# Patient Record
Sex: FEMALE | Race: BLACK OR AFRICAN AMERICAN | NOT HISPANIC OR LATINO | URBAN - METROPOLITAN AREA
[De-identification: names, ages, dates, MRNs, and addresses within clinical notes are randomized per-mention and may not be internally consistent; named-entity substitution may affect disease eponyms.]

---

## 2020-09-15 ENCOUNTER — OUT OF OFFICE VISIT (OUTPATIENT)
Dept: URBAN - METROPOLITAN AREA MEDICAL CENTER 27 | Facility: MEDICAL CENTER | Age: 50
End: 2020-09-15
Payer: COMMERCIAL

## 2020-09-15 ENCOUNTER — LAB OUTSIDE AN ENCOUNTER (OUTPATIENT)
Dept: URBAN - METROPOLITAN AREA CLINIC 78 | Facility: CLINIC | Age: 50
End: 2020-09-15

## 2020-09-15 DIAGNOSIS — R74.0 ABNORMAL AST AND ALT: ICD-10-CM

## 2020-09-15 DIAGNOSIS — K83.8 ACQUIRED DILATION OF COMMON BILE DUCT: ICD-10-CM

## 2020-09-15 DIAGNOSIS — R10.84 ABDOMINAL CRAMPING, GENERALIZED: ICD-10-CM

## 2020-09-15 DIAGNOSIS — R79.89 ABNORMAL C-REACTIVE PROTEIN: ICD-10-CM

## 2020-09-15 PROCEDURE — 99232 SBSQ HOSP IP/OBS MODERATE 35: CPT | Performed by: NURSE PRACTITIONER

## 2020-09-15 PROCEDURE — G8427 DOCREV CUR MEDS BY ELIG CLIN: HCPCS | Performed by: INTERNAL MEDICINE

## 2020-09-15 PROCEDURE — 99222 1ST HOSP IP/OBS MODERATE 55: CPT | Performed by: INTERNAL MEDICINE

## 2020-11-02 ENCOUNTER — OFFICE VISIT (OUTPATIENT)
Dept: URBAN - METROPOLITAN AREA CLINIC 23 | Facility: CLINIC | Age: 50
End: 2020-11-02

## 2020-11-13 ENCOUNTER — OFFICE VISIT (OUTPATIENT)
Dept: URBAN - METROPOLITAN AREA CLINIC 33 | Facility: CLINIC | Age: 50
End: 2020-11-13

## 2020-11-24 ENCOUNTER — OFFICE VISIT (OUTPATIENT)
Dept: URBAN - METROPOLITAN AREA CLINIC 35 | Facility: CLINIC | Age: 50
End: 2020-11-24

## 2020-11-24 VITALS
BODY MASS INDEX: 24.36 KG/M2 | TEMPERATURE: 98.4 F | HEIGHT: 71 IN | WEIGHT: 174 LBS | DIASTOLIC BLOOD PRESSURE: 98 MMHG | SYSTOLIC BLOOD PRESSURE: 160 MMHG

## 2020-11-24 RX ORDER — OLMESARTAN MEDOXOMIL 40 MG/1
1 TABLET TABLET ORAL ONCE A DAY
Qty: 30 | Status: ACTIVE | COMMUNITY

## 2020-11-24 RX ORDER — PANTOPRAZOLE SODIUM 40 MG/1
1 TABLET TABLET, DELAYED RELEASE ORAL ONCE A DAY
Qty: 90 TABLET | Refills: 1 | OUTPATIENT
Start: 2020-11-24

## 2020-11-24 RX ORDER — FENTANYL 25 UG/H
1 PATCH TO SKIN PATCH TRANSDERMAL
Status: ACTIVE | COMMUNITY

## 2020-11-24 RX ORDER — HYOSCYAMINE SULFATE 0.12 MG/1
1 TABLET AS NEEDED TABLET ORAL
Qty: 60 TABLET | Refills: 1 | OUTPATIENT
Start: 2020-11-24

## 2020-11-24 RX ORDER — NALOXEGOL OXALATE 25 MG/1
1 TABLET IN THE MORNING TABLET, FILM COATED ORAL ONCE A DAY
Qty: 30 | Refills: 1 | OUTPATIENT
Start: 2020-11-24

## 2020-11-24 RX ORDER — CETIRIZINE HYDROCHLORIDE 10 MG/1
1 TABLET TABLET, FILM COATED ORAL ONCE A DAY
Qty: 30 | Status: ACTIVE | COMMUNITY

## 2020-11-24 RX ORDER — ERGOCALCIFEROL 1.25 MG/1
1 CAPSULE CAPSULE ORAL
Qty: 4 | Status: ACTIVE | COMMUNITY

## 2020-11-24 RX ORDER — AMLODIPINE BESYLATE 10 MG/1
1 TABLET TABLET ORAL ONCE A DAY
Qty: 30 | Status: ACTIVE | COMMUNITY

## 2020-11-24 RX ORDER — LORAZEPAM 1 MG/1
1 TABLET AT BEDTIME AS NEEDED TABLET ORAL ONCE A DAY
Status: ACTIVE | COMMUNITY

## 2020-11-24 RX ORDER — LEVOTHYROXINE SODIUM 0.15 MG/1
1 TABLET IN THE MORNING ON AN EMPTY STOMACH TABLET ORAL ONCE A DAY
Qty: 30 | Status: ACTIVE | COMMUNITY

## 2020-11-24 RX ORDER — ALBUTEROL SULFATE 2.5 MG/3ML
3 ML AS NEEDED SOLUTION RESPIRATORY (INHALATION)
Status: ACTIVE | COMMUNITY

## 2020-11-24 NOTE — EXAM-MIGRATED EXAMINATIONS
GENERAL APPEARANCE: - alert, in no acute distress, well developed, well nourished;   HEAD: - normocephalic, atraumatic;   EYES: - sclera anicteric bilaterally;   EARS: - normal;   ORAL CAVITY: - mucosa moist;   THROAT: - clear;   NECK/THYROID: - neck supple, full range of motion, no cervical lymphadenopathy, no thyroid nodules, no thyromegaly, trachea midline;   LYMPH NODES: - no cervical adenopathy, no supraclavicular adenopathy, no periumbilical adenopathy;   SKIN: - no suspicious lesions, warm and dry, no spider angiomata, palmar erythema or icterus;   HEART: - no murmurs, regular rate and rhythm, S1, S2 normal;   LUNGS: - clear to auscultation bilaterally, good air movement, no wheezes, rales, rhonchi;   ABDOMEN: - bowel sounds present, no masses palpable, no rebound tenderness, soft, nontender, nondistended.  Hepatomegaly and splenomegaly;   RECTAL: - deferred by patient;   MUSCULOSKELETAL: - normal posture, normal gait and station, no decreased range of motion;   EXTREMITIES: - no clubbing, cyanosis, or edema;   NEUROLOGIC: - cranial nerves 2-12 grossly intact;   PSYCH: - cooperative with exam, mood/affect full range;

## 2020-11-24 NOTE — HPI-MIGRATED HPI
;   ;   ;   ;   ;   ;   ;   ;     Gastroparesis : She admit being diagnosed with Gastroparesis in () at  [facility] in [place], [place]..  She has been consuming small portion meals throughout the day with some relief. ;   Nausea/Vomiting : Admit episodes of nausea and vomiting that is associated with epigastric pain. She will take Ativan prn severe episodes with relief. She report Zofran cause headaches and constipation.;   Bloating : Admit generalized abdominal bloating that is associated with episodes of constipation.  Symptoms improve with evacuations.;   Change in bowel habits : Admit change in bowel habits with onset in (). Described as alternating between constipation and diarrhea.  She attribute symptoms to high dose of pain medication which was treated with Golytely doses throughout the day from  to .   Currently admit 1 incomplete bowel movement per day with occasional strain. Stools are formed to small semi-formed with occasional hard consistency. She will take Miralax occasionally without significant benefit. Also try increase fiber intake and walk more. Goal is to drink at least 2 liters of water per day.   Patient's first colonoscopy was completed at age 63. Her colonoscopy was performed by Dr. Cody Moon at [facility] in [place], [place] revealing 1 colon polyp (reported by patient).;   Rectal Bleeding : Admit rectal bleeding with onset fall of  and has occurred in an intermittent pattern.  Described at bright red to burgundy blood that is present on tissue and within the toilet bowl.  Symptoms will occur for 2 days then dissipate.  Last occurred in 2020.   She denies bleeding during strenuous evacuations.   She denies having a stool study. ;   Abdominal Pain : Admit abdominal pain with onset (2020). Located in the epigastrium.  Described as a sharp burning pain that will occur with or without eating.  Admit associated nausea and vomiting.  Consuming greasy, fatty, acidic or spicy food will cause symptoms to worsen.   She presented to [facility] ED on (2020) and (10/12/2020) with c/o abd pain worsening post prandial, as well as weakness, and SOB with ambulation. Upon arrival to the hospital patient's Hgb was 7.6 and Hct 23.5. Patient was given 2 units of PRBC. Hgb was then 9.4. She continued to c/o severe abdominal pain, no changes in bowel pattern. Patient was admitted and discharged on 10/18/2020 with Pepcid 20 mg 1 BID, which she has been taking without significant relief.  Admit having an EGD () at [facility] in [place], [place], with findings of H. pylori. She was treated with IV antibiotics. Also diagnosed with  Pancreatitis during this hospitalization in . ;   Hospital Follow Up : Patient presents today for a hospital follow up. Patient was admitted to [facility] on 2020, 10/12/2020, and again on 2020.  She was referred to our office by Dr. PAOLO Giordano (internal medicine) for a consultation for abdominal pain. Patient admits a previous hx of Pancreatitis in .   Patient presented herself to [facility] ED on 2020 for a Sickle Cell Crisis, she c/o worsening abd pain and SOB. Patient was admitted to the hospital until 10/9/2020. Upon arrival patient's labs revealed severe anemia and elevated LFTs. RBC 2.54, Hgb 8.3, Hct 24.8, Alk Phos 155, AST 62, ALT 58, Bili 3.0,   She completed a transfusion on 2020 in which she received 2 units of PRBC, Hgb and Hct had mild improved after transfusion.  Blood Transfusion : - >10 units , 2019- 6 Units    During her stay she had a Nuclear Medicine Lung Perfusion Scan that revealed normal lung perfusion.   She also had a Chest X-ray completed on 2020 that showed increased lung opacitis bilaterally, edema versus atypical infection. A repeat Chest X-ray was completed on 2020 revealing stable moderate to sever enlargment of the cardiac silhouette suggestive of cardiomegaly. No acute infiltrate.   Patient had breathing treatments during her hospital stay that improved her SOB. On 2020 patient's Hgb dropped from 8.3 to 7.9 and 2 more units of PRBC was given to patient. Patient was started on a Fenanyl Patch for pain patient c/o itching and dizziness with the use of patch but reports that is helping mildly.   Patient was discharged on 10/9/2020 she then returned to the ED 10/12/2020 with c/o abd pain worsening post prandial, as well as weakness, and SOB with ambulation. Upon arrival to the hospital patient's Hgb was 7.6 and Hct 23.5. Patient was given 2 units of PRBC. Hgb was then 9.4. She continued to c/o severe abdominal/ pain, no changes in bowel pattern. Patient was admitted and discharged on 10/18/2020.   On 2020 she presented to the ED department again with a hx of sickle cell, chronic back pain, asthma, anemia, pulmonary hypertension with c/o chest pain and SOB onset was 1 hr prior to her arriving to ER. She reports that the pain that she experienced was not like any of the other symptoms that she had expereinced previously.   Patient reports her abdominal pain is located in her epigastric region that she describes as sharp and burning. She admits that the pain will last for 1-2 days and then subside on its on. She denies taking any medications to relieve symptoms. She denies that symptoms are reduced after a bowel movement.   Currently reports 1-2 normal bowel movements a day with no blood, mucus, melena, pruritus ani, or rectal pain.   Patient's first colonoscopy was completed in  at age 43 due to a family hx of colon cancer - Brother -dx'd late 50's -  at age 63. Her colonoscopy revealed 1 colon polyp. ;   Pancreatitis : Patient admit h/o drug induced pancreatitis with onset in () at which time she was in patient at  [facility] in [place], [place].;

## 2020-11-25 ENCOUNTER — TELEPHONE ENCOUNTER (OUTPATIENT)
Dept: URBAN - METROPOLITAN AREA CLINIC 35 | Facility: CLINIC | Age: 50
End: 2020-11-25

## 2020-12-01 ENCOUNTER — TELEPHONE ENCOUNTER (OUTPATIENT)
Dept: URBAN - METROPOLITAN AREA CLINIC 35 | Facility: CLINIC | Age: 50
End: 2020-12-01

## 2020-12-02 ENCOUNTER — OFFICE VISIT (OUTPATIENT)
Dept: URBAN - METROPOLITAN AREA CLINIC 35 | Facility: CLINIC | Age: 50
End: 2020-12-02

## 2020-12-02 ENCOUNTER — TELEPHONE ENCOUNTER (OUTPATIENT)
Dept: URBAN - METROPOLITAN AREA CLINIC 35 | Facility: CLINIC | Age: 50
End: 2020-12-02

## 2020-12-02 VITALS — WEIGHT: 170 LBS | BODY MASS INDEX: 23.8 KG/M2 | HEIGHT: 71 IN

## 2020-12-02 PROBLEM — 16932000 NAUSEA AND VOMITING: Status: ACTIVE | Noted: 2020-11-24

## 2020-12-02 PROBLEM — 79922009 EPIGASTRIC PAIN: Status: ACTIVE | Noted: 2020-11-24

## 2020-12-02 PROBLEM — 429006005 FAMILY HISTORY OF MALIGNANT NEOPLASM OF GASTROINTESTINAL TRACT: Status: ACTIVE | Noted: 2020-11-24

## 2020-12-02 PROBLEM — 88111009 CHANGE IN BOWEL HABIT: Status: ACTIVE | Noted: 2020-11-24

## 2020-12-02 PROBLEM — 271832001 FLATULENCE, ERUCTATION AND GAS PAIN: Status: ACTIVE | Noted: 2020-11-24

## 2020-12-02 PROBLEM — 428283002 HISTORY OF POLYP OF COLON (SITUATION): Status: ACTIVE | Noted: 2020-11-24

## 2020-12-02 PROBLEM — 274774002: Status: ACTIVE | Noted: 2020-11-24

## 2020-12-02 PROBLEM — 80515008: Status: ACTIVE | Noted: 2020-11-24

## 2020-12-02 PROBLEM — 266464001 HEMORRHAGE OF RECTUM AND ANUS: Status: ACTIVE | Noted: 2020-11-24

## 2020-12-02 RX ORDER — PANTOPRAZOLE SODIUM 40 MG/1
1 TABLET TABLET, DELAYED RELEASE ORAL ONCE A DAY
Qty: 90 TABLET | Refills: 1 | Status: ACTIVE | COMMUNITY
Start: 2020-11-24

## 2020-12-02 RX ORDER — ALBUTEROL SULFATE 2.5 MG/3ML
3 ML AS NEEDED SOLUTION RESPIRATORY (INHALATION)
Status: ACTIVE | COMMUNITY

## 2020-12-02 RX ORDER — CETIRIZINE HYDROCHLORIDE 10 MG/1
1 TABLET TABLET, FILM COATED ORAL ONCE A DAY
Qty: 30 | Status: ACTIVE | COMMUNITY

## 2020-12-02 RX ORDER — LEVOTHYROXINE SODIUM 0.15 MG/1
1 TABLET IN THE MORNING ON AN EMPTY STOMACH TABLET ORAL ONCE A DAY
Qty: 30 | Status: ACTIVE | COMMUNITY

## 2020-12-02 RX ORDER — ERGOCALCIFEROL 1.25 MG/1
1 CAPSULE CAPSULE ORAL
Qty: 4 | Status: ACTIVE | COMMUNITY

## 2020-12-02 RX ORDER — HYOSCYAMINE SULFATE 0.12 MG/1
1 TABLET AS NEEDED TABLET ORAL
Qty: 60 TABLET | Refills: 1 | Status: ACTIVE | COMMUNITY
Start: 2020-11-24

## 2020-12-02 RX ORDER — FENTANYL 25 UG/H
1 PATCH TO SKIN PATCH TRANSDERMAL
Status: ACTIVE | COMMUNITY

## 2020-12-02 RX ORDER — NALOXEGOL OXALATE 25 MG/1
1 TABLET IN THE MORNING TABLET, FILM COATED ORAL ONCE A DAY
Qty: 30 | Refills: 1 | Status: ACTIVE | COMMUNITY
Start: 2020-11-24

## 2020-12-02 RX ORDER — AMLODIPINE BESYLATE 10 MG/1
1 TABLET TABLET ORAL ONCE A DAY
Qty: 30 | Status: ACTIVE | COMMUNITY

## 2020-12-02 RX ORDER — OLMESARTAN MEDOXOMIL 40 MG/1
1 TABLET TABLET ORAL ONCE A DAY
Qty: 30 | Status: ACTIVE | COMMUNITY

## 2020-12-02 RX ORDER — LORAZEPAM 1 MG/1
1 TABLET AT BEDTIME AS NEEDED TABLET ORAL ONCE A DAY
Status: ACTIVE | COMMUNITY

## 2020-12-02 NOTE — HPI-MIGRATED HPI
;   ;   ;   ;   ;   ;   ;   ;     Gastroparesis : Continutes to consume small portion meals throughout the day with some relief.   Last visit (2020)               She admit being diagnosed with Gastroparesis in () at  [facility] in [place], [place]..  She has been consuming small portion meals throughout the day with some relief. ;   Nausea/Vomiting : Denies vomiting. Admits occasional nausea associated with epigastric pain.   Last visit (2020)               Admit episodes of nausea and vomiting that is associated with epigastric pain. She will take Ativan prn severe episodes with relief. She report Zofran cause headaches and constipation.;   Bloating : Pt started having abd pain and bloating since Saturday.  She states her distention has improved since this morning. Admits relief of symptoms when lying down. Denies relief of symptoms after bowel movements or flatus.   Last visit (2020)               Admit generalized abdominal bloating that is associated with episodes of constipation.  Symptoms improve with evacuations.;   Change in bowel habits : Patient currently 1 bowel movement a day. Stools are typically formed and soft. She has been taking Movantik for 3 days and has not noticed any improvements.   Last visit (2020)               Admit change in bowel habits with onset in (). Described as alternating between constipation and diarrhea.  She attribute symptoms to high dose of pain medication which was treated with Golytely doses throughout the day from  to .   Currently admit 1 incomplete bowel movement per day with occasional strain. Stools are formed to small semi-formed with occasional hard consistency. She will take Miralax occasionally without significant benefit. Also try increase fiber intake and walk more. Goal is to drink at least 2 liters of water per day.   Patient's first colonoscopy was completed at age 63. Her colonoscopy was performed by Dr. Cody Moon at [facility] in [place], [place] revealing 1 colon polyp (reported by patient).;   Rectal Bleeding : Denies episodes of rectal bleeding since last visit.   Last visit (2020)               Admit rectal bleeding with onset fall of  and has occurred in an intermittent pattern.  Described at bright red to burgundy blood that is present on tissue and within the toilet bowl.  Symptoms will occur for 2 days then dissipate.  Last occurred in 2020.   She denies bleeding during strenuous evacuations.   She denies having a stool study. ;   Abdominal Pain : Continues to admit epigastric pain. Also with RUQ pain, and trying Hyoscamine without relief of symptoms.  She is currently taking Pantoprazole without relief. Denies any changes since last visit.   Last visit (2020)               Admit abdominal pain with onset (2020). Located in the epigastrium.  Described as a sharp burning pain that will occur with or without eating.  Admit associated nausea and vomiting.  Consuming greasy, fatty, acidic or spicy food will cause symptoms to worsen.   She presented to [facility] ED on (2020) and (10/12/2020) with c/o abd pain worsening post prandial, as well as weakness, and SOB with ambulation. Upon arrival to the hospital patient's Hgb was 7.6 and Hct 23.5. Patient was given 2 units of PRBC. Hgb was then 9.4. She continued to c/o severe abdominal pain, no changes in bowel pattern. Patient was admitted and discharged on 10/18/2020 with Pepcid 20 mg 1 BID, which she has been taking without significant relief.  Admit having an EGD () at [facility] in [place], [place], with findings of H. pylori. She was treated with IV antibiotics. Also diagnosed with  Pancreatitis during this hospitalization in . ;   Hospital Follow Up :  Last visit (2020)               Patient presents today for a hospital follow up. Patient was admitted to [facility] on 2020, 10/12/2020, and again on 2020.  She was referred to our office by Dr. PAOLO Giordano (internal medicine) for a consultation for abdominal pain. Patient admits a previous hx of Pancreatitis in .   Patient presented herself to [facility] ED on 2020 for a Sickle Cell Crisis, she c/o worsening abd pain and SOB. Patient was admitted to the hospital until 10/9/2020. Upon arrival patient's labs revealed severe anemia and elevated LFTs. RBC 2.54, Hgb 8.3, Hct 24.8, Alk Phos 155, AST 62, ALT 58, Bili 3.0,   She completed a transfusion on 2020 in which she received 2 units of PRBC, Hgb and Hct had mild improved after transfusion.  Blood Transfusion : 2020- >10 units , 2019- 6 Units    During her stay she had a Nuclear Medicine Lung Perfusion Scan that revealed normal lung perfusion.   She also had a Chest X-ray completed on 2020 that showed increased lung opacitis bilaterally, edema versus atypical infection. A repeat Chest X-ray was completed on 2020 revealing stable moderate to sever enlargment of the cardiac silhouette suggestive of cardiomegaly. No acute infiltrate.   Patient had breathing treatments during her hospital stay that improved her SOB. On 2020 patient's Hgb dropped from 8.3 to 7.9 and 2 more units of PRBC was given to patient. Patient was started on a Fenanyl Patch for pain patient c/o itching and dizziness with the use of patch but reports that is helping mildly.   Patient was discharged on 10/9/2020 she then returned to the ED 10/12/2020 with c/o abd pain worsening post prandial, as well as weakness, and SOB with ambulation. Upon arrival to the hospital patient's Hgb was 7.6 and Hct 23.5. Patient was given 2 units of PRBC. Hgb was then 9.4. She continued to c/o severe abdominal/ pain, no changes in bowel pattern. Patient was admitted and discharged on 10/18/2020.   On 2020 she presented to the ED department again with a hx of sickle cell, chronic back pain, asthma, anemia, pulmonary hypertension with c/o chest pain and SOB onset was 1 hr prior to her arriving to ER. She reports that the pain that she experienced was not like any of the other symptoms that she had expereinced previously.   Patient reports her abdominal pain is located in her epigastric region that she describes as sharp and burning. She admits that the pain will last for 1-2 days and then subside on its on. She denies taking any medications to relieve symptoms. She denies that symptoms are reduced after a bowel movement.   Currently reports 1-2 normal bowel movements a day with no blood, mucus, melena, pruritus ani, or rectal pain.   Patient's first colonoscopy was completed in  at age 43 due to a family hx of colon cancer - Brother -dx'd late 50's -  at age 63. Her colonoscopy revealed 1 colon polyp. ;   Pancreatitis :   Last visit (2020)               Patient admit h/o drug induced pancreatitis with onset in () at which time she was in patient at  [facility] in [place], [place].;

## 2020-12-03 ENCOUNTER — TELEPHONE ENCOUNTER (OUTPATIENT)
Dept: URBAN - METROPOLITAN AREA CLINIC 35 | Facility: CLINIC | Age: 50
End: 2020-12-03

## 2020-12-04 ENCOUNTER — TELEPHONE ENCOUNTER (OUTPATIENT)
Dept: URBAN - METROPOLITAN AREA CLINIC 35 | Facility: CLINIC | Age: 50
End: 2020-12-04

## 2020-12-07 ENCOUNTER — OFFICE VISIT (OUTPATIENT)
Dept: URBAN - METROPOLITAN AREA MEDICAL CENTER 27 | Facility: MEDICAL CENTER | Age: 50
End: 2020-12-07

## 2020-12-10 ENCOUNTER — TELEPHONE ENCOUNTER (OUTPATIENT)
Dept: URBAN - METROPOLITAN AREA CLINIC 35 | Facility: CLINIC | Age: 50
End: 2020-12-10

## 2020-12-10 RX ORDER — SUCRALFATE 1 G/1
1 TABLET ON AN EMPTY STOMACH TABLET ORAL TWICE A DAY
Qty: 60 | Refills: 1 | OUTPATIENT
Start: 2020-12-07

## 2020-12-16 ENCOUNTER — TELEPHONE ENCOUNTER (OUTPATIENT)
Dept: URBAN - METROPOLITAN AREA CLINIC 35 | Facility: CLINIC | Age: 50
End: 2020-12-16

## 2020-12-21 ENCOUNTER — OFFICE VISIT (OUTPATIENT)
Dept: URBAN - METROPOLITAN AREA CLINIC 33 | Facility: CLINIC | Age: 50
End: 2020-12-21

## 2020-12-21 RX ORDER — SUCRALFATE 1 G/1
1 TABLET ON AN EMPTY STOMACH TABLET ORAL TWICE A DAY
Qty: 60 | Refills: 1 | Status: ACTIVE | COMMUNITY
Start: 2020-12-07

## 2020-12-21 RX ORDER — FENTANYL 25 UG/H
1 PATCH TO SKIN PATCH TRANSDERMAL
Status: ACTIVE | COMMUNITY

## 2020-12-21 RX ORDER — AMLODIPINE BESYLATE 10 MG/1
1 TABLET TABLET ORAL ONCE A DAY
Qty: 30 | Status: ACTIVE | COMMUNITY

## 2020-12-21 RX ORDER — OLMESARTAN MEDOXOMIL 40 MG/1
1 TABLET TABLET ORAL ONCE A DAY
Qty: 30 | Status: ACTIVE | COMMUNITY

## 2020-12-21 RX ORDER — HYOSCYAMINE SULFATE 0.12 MG/1
1 TABLET AS NEEDED TABLET ORAL
Qty: 60 TABLET | Refills: 1 | Status: ACTIVE | COMMUNITY
Start: 2020-11-24

## 2020-12-21 RX ORDER — LEVOTHYROXINE SODIUM 0.15 MG/1
1 TABLET IN THE MORNING ON AN EMPTY STOMACH TABLET ORAL ONCE A DAY
Qty: 30 | Status: ACTIVE | COMMUNITY

## 2020-12-21 RX ORDER — ERGOCALCIFEROL 1.25 MG/1
1 CAPSULE CAPSULE ORAL
Qty: 4 | Status: ACTIVE | COMMUNITY

## 2020-12-21 RX ORDER — CETIRIZINE HYDROCHLORIDE 10 MG/1
1 TABLET TABLET, FILM COATED ORAL ONCE A DAY
Qty: 30 | Status: ACTIVE | COMMUNITY

## 2020-12-21 RX ORDER — NALOXEGOL OXALATE 25 MG/1
1 TABLET IN THE MORNING TABLET, FILM COATED ORAL ONCE A DAY
Qty: 30 | Refills: 1 | Status: ACTIVE | COMMUNITY
Start: 2020-11-24

## 2020-12-21 RX ORDER — PANTOPRAZOLE SODIUM 40 MG/1
1 TABLET TABLET, DELAYED RELEASE ORAL ONCE A DAY
Qty: 90 TABLET | Refills: 1 | Status: ACTIVE | COMMUNITY
Start: 2020-11-24

## 2020-12-21 RX ORDER — ALBUTEROL SULFATE 2.5 MG/3ML
3 ML AS NEEDED SOLUTION RESPIRATORY (INHALATION)
Status: ACTIVE | COMMUNITY

## 2020-12-21 RX ORDER — LORAZEPAM 1 MG/1
1 TABLET AT BEDTIME AS NEEDED TABLET ORAL ONCE A DAY
Status: ACTIVE | COMMUNITY

## 2020-12-21 NOTE — HPI-MIGRATED HPI
;   ;   ;   ;   ;   ;   ;   ;     Gastroparesis : She admits/denies any assoicated symptoms at this time.   Last visit (2020) Continutes to consume small portion meals throughout the day with some relief.   Last visit (2020)               She admit being diagnosed with Gastroparesis in () at  [facility] in [place], [place]  She has been consuming small portion meals throughout the day with some relief. ;   Nausea/Vomiting : She admits/denies continued episodes of nausea/vomiting since her last visit.    Last visit (2020) Denies vomiting. Admits occasional nausea associated with epigastric pain.   Last visit (2020)               Admit episodes of nausea and vomiting that is associated with epigastric pain. She will take Ativan prn severe episodes with relief. She report Zofran cause headaches and constipation.;   Bloating : She admits/denies improvement in bloating episodes.   Last visit (2020) Pt started having abd pain and bloating since Saturday.  She states her distention has improved since this morning. Admits relief of symptoms when lying down. Denies relief of symptoms after bowel movements or flatus.   Last visit (2020)               Admit generalized abdominal bloating that is associated with episodes of constipation.  Symptoms improve with evacuations.;   Change in bowel habits : Patient admits/denies that her bowel habits have returned to normal since her last visit.   Currently she reports --- bowel movements ---. Her stools are ---- with/without blood, mucus, melena, pruritus ani, or rectal pain.   Last visit (2020) Patient currently 1 bowel movement a day. Stools are typically formed and soft. She has been taking Movantik for 3 days and has not noticed any improvements.   Last visit (2020)                Admit change in bowel habits with onset in (). Described as alternating between constipation and diarrhea.  She attribute symptoms to high dose of pain medication which was treated with Golytely doses throughout the day from  to .   Currently admit 1 incomplete bowel movement per day with occasional strain. Stools are formed to small semi-formed with occasional hard consistency. She will take Miralax occasionally without significant benefit. Also try increase fiber intake and walk more. Goal is to drink at least 2 liters of water per day.   Patient's first colonoscopy was completed at age 63. Her colonoscopy was performed by Dr. Cody Moon at [facility] in [place], [place] revealing 1 colon polyp (reported by patient).;   Rectal Bleeding : Patient admits/denies any rectal bleeding since her last visit.   Last visit (2020) Denies episodes of rectal bleeding since last visit.   Last visit (2020)               Admit rectal bleeding with onset fall of  and has occurred in an intermittent pattern.  Described at bright red to burgundy blood that is present on tissue and within the toilet bowl.  Symptoms will occur for 2 days then dissipate.  Last occurred in 2020.   She denies bleeding during strenuous evacuations.   She denies having a stool study. ;   Abdominal Pain : Patient presents today for a follow up of abdominal pain and to review her EGD results. She reports that after her procedure she experienced severe burning in her stomach and abdomen as well a sore throat and hoarseness. She was prescribed Sucralfate for abd pain. She admits/denies continued use of Sucralfate at this time.   She continues to admits abdominal pain and describes symptoms as intermittent abd pain that lasts for minutes without provocation.    Patient had a KUB completed on 2020 with normal results. She also had a MRI completed on 2020 with no acute findings, expected common bile duct distention without choledocholithiasis, no focal liver or pancreas pathology, no definite findings for pancreatitis, left renal cyst, and the findings of sickle cell disease is unchanged.   ?  Pt feels Movantik isn't helping, wants to use Golytely, hasn't done enema yet, told her to proceed with enema    Last visit (2020) Continues to admit epigastric pain. Also with RUQ pain, and trying Hyoscamine without relief of symptoms.  She is currently taking Pantoprazole without relief. Denies any changes since last visit.   Last visit (2020)               Admit abdominal pain with onset (2020). Located in the epigastrium.  Described as a sharp burning pain that will occur with or without eating.  Admit associated nausea and vomiting.  Consuming greasy, fatty, acidic or spicy food will cause symptoms to worsen.   She presented to [facility] ED on (2020) and (10/12/2020) with c/o abd pain worsening post prandial, as well as weakness, and SOB with ambulation. Upon arrival to the hospital patient's Hgb was 7.6 and Hct 23.5. Patient was given 2 units of PRBC. Hgb was then 9.4. She continued to c/o severe abdominal pain, no changes in bowel pattern. Patient was admitted and discharged on 10/18/2020 with Pepcid 20 mg 1 BID, which she has been taking without significant relief.  Admit having an EGD () at [facility] in [place], [place], with findings of H. pylori. She was treated with IV antibiotics. Also diagnosed with  Pancreatitis during this hospitalization in . ;   Hospital Follow Up : Patient was admitted to [facility] hospital on 2020 - 2020 due to a sickle cell crisis. She c/o abd pain, leg pain, and acute crisis but with abd discomfort.  Patient improved with 2 UPRBC, IV Fluids, and Fentanyl PCA.   Last visit (2020)                Patient presents today for a hospital follow up. Patient was admitted to [facility] on 2020, 10/12/2020, and again on 2020.  She was referred to our office by Dr. PAOLO Giordano (internal medicine) for a consultation for abdominal pain. Patient admits a previous hx of Pancreatitis in .   Patient presented herself to [facility] ED on 2020 for a Sickle Cell Crisis, she c/o worsening abd pain and SOB. Patient was admitted to the hospital until 10/9/2020. Upon arrival patient's labs revealed severe anemia and elevated LFTs. RBC 2.54, Hgb 8.3, Hct 24.8, Alk Phos 155, AST 62, ALT 58, Bili 3.0,   She completed a transfusion on 2020 in which she received 2 units of PRBC, Hgb and Hct had mild improved after transfusion.  Blood Transfusion : - >10 units , 2019- 6 Units    During her stay she had a Nuclear Medicine Lung Perfusion Scan that revealed normal lung perfusion.   She also had a Chest X-ray completed on 2020 that showed increased lung opacitis bilaterally, edema versus atypical infection. A repeat Chest X-ray was completed on 2020 revealing stable moderate to sever enlargment of the cardiac silhouette suggestive of cardiomegaly. No acute infiltrate.   Patient had breathing treatments during her hospital stay that improved her SOB. On 2020 patient's Hgb dropped from 8.3 to 7.9 and 2 more units of PRBC was given to patient. Patient was started on a Fenanyl Patch for pain patient c/o itching and dizziness with the use of patch but reports that is helping mildly.   Patient was discharged on 10/9/2020 she then returned to the ED 10/12/2020 with c/o abd pain worsening post prandial, as well as weakness, and SOB with ambulation. Upon arrival to the hospital patient's Hgb was 7.6 and Hct 23.5. Patient was given 2 units of PRBC. Hgb was then 9.4. She continued to c/o severe abdominal/ pain, no changes in bowel pattern. Patient was admitted and discharged on 10/18/2020.   On 2020 she presented to the ED department again with a hx of sickle cell, chronic back pain, asthma, anemia, pulmonary hypertension with c/o chest pain and SOB onset was 1 hr prior to her arriving to ER. She reports that the pain that she experienced was not like any of the other symptoms that she had expereinced previously.   Patient reports her abdominal pain is located in her epigastric region that she describes as sharp and burning. She admits that the pain will last for 1-2 days and then subside on its on. She denies taking any medications to relieve symptoms. She denies that symptoms are reduced after a bowel movement.   Currently reports 1-2 normal bowel movements a day with no blood, mucus, melena, pruritus ani, or rectal pain.   Patient's first colonoscopy was completed in  at age 43 due to a family hx of colon cancer - Brother -dx'd late 50's -  at age 63. Her colonoscopy revealed 1 colon polyp. ;   Pancreatitis : She admits/denies any associated symptoms at this time.   Last visit (2020)                Patient admit h/o drug induced pancreatitis with onset in () at which time she was in patient at  [facility] in [place], [place].;

## 2020-12-23 ENCOUNTER — TELEPHONE ENCOUNTER (OUTPATIENT)
Dept: URBAN - METROPOLITAN AREA CLINIC 35 | Facility: CLINIC | Age: 50
End: 2020-12-23

## 2021-03-09 ENCOUNTER — OFFICE VISIT (OUTPATIENT)
Dept: URBAN - METROPOLITAN AREA CLINIC 35 | Facility: CLINIC | Age: 51
End: 2021-03-09

## 2021-05-04 ENCOUNTER — TELEPHONE ENCOUNTER (OUTPATIENT)
Dept: URBAN - METROPOLITAN AREA CLINIC 35 | Facility: CLINIC | Age: 51
End: 2021-05-04

## 2021-06-03 ENCOUNTER — OFFICE VISIT (OUTPATIENT)
Dept: URBAN - METROPOLITAN AREA CLINIC 33 | Facility: CLINIC | Age: 51
End: 2021-06-03

## 2021-08-31 ENCOUNTER — TELEPHONE ENCOUNTER (OUTPATIENT)
Dept: URBAN - METROPOLITAN AREA CLINIC 35 | Facility: CLINIC | Age: 51
End: 2021-08-31

## 2021-08-31 RX ORDER — NALOXEGOL OXALATE 25 MG/1
1 TABLET IN THE MORNING TABLET, FILM COATED ORAL ONCE A DAY
Qty: 30 | Refills: 0 | OUTPATIENT
Start: 2020-11-24

## 2021-08-31 RX ORDER — FAMOTIDINE 20 MG/1
1 TABLET TABLET, FILM COATED ORAL TWICE A DAY
Qty: 60 TABLET | Refills: 0 | OUTPATIENT
Start: 2021-09-01

## 2021-09-01 ENCOUNTER — TELEPHONE ENCOUNTER (OUTPATIENT)
Dept: URBAN - METROPOLITAN AREA CLINIC 35 | Facility: CLINIC | Age: 51
End: 2021-09-01

## 2022-05-13 ENCOUNTER — TELEPHONE ENCOUNTER (OUTPATIENT)
Dept: URBAN - METROPOLITAN AREA CLINIC 35 | Facility: CLINIC | Age: 52
End: 2022-05-13

## 2022-05-16 ENCOUNTER — OFFICE VISIT (OUTPATIENT)
Dept: URBAN - METROPOLITAN AREA CLINIC 33 | Facility: CLINIC | Age: 52
End: 2022-05-16

## 2022-05-16 RX ORDER — OLMESARTAN MEDOXOMIL 40 MG/1
1 TABLET TABLET ORAL ONCE A DAY
Qty: 30 | Status: ACTIVE | COMMUNITY

## 2022-05-16 RX ORDER — LORAZEPAM 1 MG/1
1 TABLET AT BEDTIME AS NEEDED TABLET ORAL ONCE A DAY
Status: ACTIVE | COMMUNITY

## 2022-05-16 RX ORDER — AMLODIPINE BESYLATE 10 MG/1
1 TABLET TABLET ORAL ONCE A DAY
Qty: 30 | Status: ACTIVE | COMMUNITY

## 2022-05-16 RX ORDER — CETIRIZINE HYDROCHLORIDE 10 MG/1
1 TABLET TABLET, FILM COATED ORAL ONCE A DAY
Qty: 30 | Status: ACTIVE | COMMUNITY

## 2022-05-16 RX ORDER — SUCRALFATE 1 G/1
1 TABLET ON AN EMPTY STOMACH TABLET ORAL TWICE A DAY
Qty: 60 | Refills: 1 | Status: ACTIVE | COMMUNITY
Start: 2020-12-07

## 2022-05-16 RX ORDER — NALOXEGOL OXALATE 25 MG/1
1 TABLET IN THE MORNING TABLET, FILM COATED ORAL ONCE A DAY
Qty: 30 | Refills: 0 | Status: ACTIVE | COMMUNITY
Start: 2020-11-24

## 2022-05-16 RX ORDER — HYOSCYAMINE SULFATE 0.12 MG/1
1 TABLET AS NEEDED TABLET ORAL
Qty: 60 TABLET | Refills: 1 | Status: ACTIVE | COMMUNITY
Start: 2020-11-24

## 2022-05-16 RX ORDER — FENTANYL 25 UG/H
1 PATCH TO SKIN PATCH TRANSDERMAL
Status: ACTIVE | COMMUNITY

## 2022-05-16 RX ORDER — FAMOTIDINE 20 MG/1
1 TABLET TABLET, FILM COATED ORAL TWICE A DAY
Qty: 60 TABLET | Refills: 0 | Status: ACTIVE | COMMUNITY
Start: 2021-09-01

## 2022-05-16 RX ORDER — ALBUTEROL SULFATE 2.5 MG/3ML
3 ML AS NEEDED SOLUTION RESPIRATORY (INHALATION)
Status: ACTIVE | COMMUNITY

## 2022-05-16 RX ORDER — LEVOTHYROXINE SODIUM 0.15 MG/1
1 TABLET IN THE MORNING ON AN EMPTY STOMACH TABLET ORAL ONCE A DAY
Qty: 30 | Status: ACTIVE | COMMUNITY

## 2022-05-16 RX ORDER — ERGOCALCIFEROL 1.25 MG/1
1 CAPSULE CAPSULE ORAL
Qty: 4 | Status: ACTIVE | COMMUNITY

## 2022-05-16 RX ORDER — PANTOPRAZOLE SODIUM 40 MG/1
1 TABLET TABLET, DELAYED RELEASE ORAL ONCE A DAY
Qty: 90 TABLET | Refills: 1 | Status: ACTIVE | COMMUNITY
Start: 2020-11-24

## 2022-05-16 NOTE — HPI-CONSTIPATION
52 y/o female presents today for a consultation for constipation.   Patient called on 5/13/20222 and states the Movantik isn't helping anymore.  She state best relief when she was using  Golytely gallon 1/2 cup QD.   Currently reports -- bowel movements --- with/out strain. Her stools are --- with/out blood, mucus, or melena. He admits/denies any rectal pain or pruritus ani.

## 2022-05-17 ENCOUNTER — OFFICE VISIT (OUTPATIENT)
Dept: URBAN - METROPOLITAN AREA CLINIC 35 | Facility: CLINIC | Age: 52
End: 2022-05-17

## 2022-05-17 RX ORDER — PANTOPRAZOLE SODIUM 40 MG/1
1 TABLET TABLET, DELAYED RELEASE ORAL ONCE A DAY
Qty: 90 TABLET | Refills: 1 | Status: ACTIVE | COMMUNITY
Start: 2020-11-24

## 2022-05-17 RX ORDER — FAMOTIDINE 20 MG/1
1 TABLET TABLET, FILM COATED ORAL TWICE A DAY
Qty: 60 TABLET | Refills: 0 | Status: ACTIVE | COMMUNITY
Start: 2021-09-01

## 2022-05-17 RX ORDER — NALOXEGOL OXALATE 25 MG/1
1 TABLET IN THE MORNING TABLET, FILM COATED ORAL ONCE A DAY
Qty: 30 | Refills: 0 | OUTPATIENT

## 2022-05-17 RX ORDER — NALOXEGOL OXALATE 25 MG/1
1 TABLET IN THE MORNING TABLET, FILM COATED ORAL ONCE A DAY
Qty: 30 | Refills: 0 | Status: ACTIVE | COMMUNITY
Start: 2020-11-24

## 2022-05-17 RX ORDER — FENTANYL 25 UG/H
1 PATCH TO SKIN PATCH TRANSDERMAL
Status: ACTIVE | COMMUNITY

## 2022-05-17 RX ORDER — ERGOCALCIFEROL 1.25 MG/1
1 CAPSULE CAPSULE ORAL
Qty: 4 | Status: ACTIVE | COMMUNITY

## 2022-05-17 RX ORDER — LEVOTHYROXINE SODIUM 0.15 MG/1
1 TABLET IN THE MORNING ON AN EMPTY STOMACH TABLET ORAL ONCE A DAY
Qty: 30 | Status: ACTIVE | COMMUNITY

## 2022-05-17 RX ORDER — SUCRALFATE 1 G/1
1 TABLET ON AN EMPTY STOMACH TABLET ORAL TWICE A DAY
Qty: 60 | Refills: 1 | Status: ACTIVE | COMMUNITY
Start: 2020-12-07

## 2022-05-17 RX ORDER — OLMESARTAN MEDOXOMIL 40 MG/1
1 TABLET TABLET ORAL ONCE A DAY
Qty: 30 | Status: ACTIVE | COMMUNITY

## 2022-05-17 RX ORDER — LORAZEPAM 1 MG/1
1 TABLET AT BEDTIME AS NEEDED TABLET ORAL ONCE A DAY
Status: ACTIVE | COMMUNITY

## 2022-05-17 RX ORDER — ALBUTEROL SULFATE 2.5 MG/3ML
3 ML AS NEEDED SOLUTION RESPIRATORY (INHALATION)
Status: ACTIVE | COMMUNITY

## 2022-05-17 RX ORDER — HYOSCYAMINE SULFATE 0.12 MG/1
1 TABLET AS NEEDED TABLET ORAL
Qty: 60 TABLET | Refills: 1 | Status: ACTIVE | COMMUNITY
Start: 2020-11-24

## 2022-05-17 RX ORDER — CETIRIZINE HYDROCHLORIDE 10 MG/1
1 TABLET TABLET, FILM COATED ORAL ONCE A DAY
Qty: 30 | Status: ACTIVE | COMMUNITY

## 2022-05-17 RX ORDER — AMLODIPINE BESYLATE 10 MG/1
1 TABLET TABLET ORAL ONCE A DAY
Qty: 30 | Status: ACTIVE | COMMUNITY

## 2022-05-17 NOTE — HPI-CONSTIPATION
50 y/o female presents today for a consultation for constipation.   Patient called on 5/13/20222 and states the Movantik isn't helping anymore.  She state best relief when she was using  Golytely gallon 1/2 cup QD.   Currently reports -- bowel movements --- with/out strain. Her stools are --- with/out blood, mucus, or melena. He admits/denies any rectal pain or pruritus ani.

## 2022-06-06 ENCOUNTER — OFFICE VISIT (OUTPATIENT)
Dept: URBAN - METROPOLITAN AREA CLINIC 33 | Facility: CLINIC | Age: 52
End: 2022-06-06

## 2022-06-06 RX ORDER — HYOSCYAMINE SULFATE 0.12 MG/1
1 TABLET AS NEEDED TABLET ORAL
Qty: 60 TABLET | Refills: 1 | Status: ACTIVE | COMMUNITY
Start: 2020-11-24

## 2022-06-06 RX ORDER — ALBUTEROL SULFATE 2.5 MG/3ML
3 ML AS NEEDED SOLUTION RESPIRATORY (INHALATION)
Status: ACTIVE | COMMUNITY

## 2022-06-06 RX ORDER — LORAZEPAM 1 MG/1
1 TABLET AT BEDTIME AS NEEDED TABLET ORAL ONCE A DAY
Status: ACTIVE | COMMUNITY

## 2022-06-06 RX ORDER — OLMESARTAN MEDOXOMIL 40 MG/1
1 TABLET TABLET ORAL ONCE A DAY
Qty: 30 | Status: ACTIVE | COMMUNITY

## 2022-06-06 RX ORDER — FENTANYL 25 UG/H
1 PATCH TO SKIN PATCH TRANSDERMAL
Status: ACTIVE | COMMUNITY

## 2022-06-06 RX ORDER — SUCRALFATE 1 G/1
1 TABLET ON AN EMPTY STOMACH TABLET ORAL TWICE A DAY
Qty: 60 | Refills: 1 | Status: ACTIVE | COMMUNITY
Start: 2020-12-07

## 2022-06-06 RX ORDER — ERGOCALCIFEROL 1.25 MG/1
1 CAPSULE CAPSULE ORAL
Qty: 4 | Status: ACTIVE | COMMUNITY

## 2022-06-06 RX ORDER — LEVOTHYROXINE SODIUM 0.15 MG/1
1 TABLET IN THE MORNING ON AN EMPTY STOMACH TABLET ORAL ONCE A DAY
Qty: 30 | Status: ACTIVE | COMMUNITY

## 2022-06-06 RX ORDER — FAMOTIDINE 20 MG/1
1 TABLET TABLET, FILM COATED ORAL TWICE A DAY
Qty: 60 TABLET | Refills: 0 | Status: ACTIVE | COMMUNITY
Start: 2021-09-01

## 2022-06-06 RX ORDER — CETIRIZINE HYDROCHLORIDE 10 MG/1
1 TABLET TABLET, FILM COATED ORAL ONCE A DAY
Qty: 30 | Status: ACTIVE | COMMUNITY

## 2022-06-06 RX ORDER — PANTOPRAZOLE SODIUM 40 MG/1
1 TABLET TABLET, DELAYED RELEASE ORAL ONCE A DAY
Qty: 90 TABLET | Refills: 1 | Status: ACTIVE | COMMUNITY
Start: 2020-11-24

## 2022-06-06 RX ORDER — AMLODIPINE BESYLATE 10 MG/1
1 TABLET TABLET ORAL ONCE A DAY
Qty: 30 | Status: ACTIVE | COMMUNITY

## 2022-06-06 RX ORDER — NALOXEGOL OXALATE 25 MG/1
1 TABLET IN THE MORNING TABLET, FILM COATED ORAL ONCE A DAY
Qty: 30 | Refills: 0 | OUTPATIENT

## 2022-06-06 RX ORDER — NALOXEGOL OXALATE 25 MG/1
1 TABLET IN THE MORNING TABLET, FILM COATED ORAL ONCE A DAY
Qty: 30 | Refills: 0 | Status: ACTIVE | COMMUNITY

## 2022-06-21 ENCOUNTER — OFFICE VISIT (OUTPATIENT)
Dept: URBAN - METROPOLITAN AREA CLINIC 35 | Facility: CLINIC | Age: 52
End: 2022-06-21

## 2022-06-21 RX ORDER — SUCRALFATE 1 G/1
1 TABLET ON AN EMPTY STOMACH TABLET ORAL TWICE A DAY
Qty: 60 | Refills: 1 | Status: ACTIVE | COMMUNITY
Start: 2020-12-07

## 2022-06-21 RX ORDER — LEVOTHYROXINE SODIUM 0.15 MG/1
1 TABLET IN THE MORNING ON AN EMPTY STOMACH TABLET ORAL ONCE A DAY
Qty: 30 | Status: ACTIVE | COMMUNITY

## 2022-06-21 RX ORDER — LORAZEPAM 1 MG/1
1 TABLET AT BEDTIME AS NEEDED TABLET ORAL ONCE A DAY
Status: ACTIVE | COMMUNITY

## 2022-06-21 RX ORDER — FENTANYL 25 UG/H
1 PATCH TO SKIN PATCH TRANSDERMAL
Status: ACTIVE | COMMUNITY

## 2022-06-21 RX ORDER — CETIRIZINE HYDROCHLORIDE 10 MG/1
1 TABLET TABLET, FILM COATED ORAL ONCE A DAY
Qty: 30 | Status: ACTIVE | COMMUNITY

## 2022-06-21 RX ORDER — ALBUTEROL SULFATE 2.5 MG/3ML
3 ML AS NEEDED SOLUTION RESPIRATORY (INHALATION)
Status: ACTIVE | COMMUNITY

## 2022-06-21 RX ORDER — PANTOPRAZOLE SODIUM 40 MG/1
1 TABLET TABLET, DELAYED RELEASE ORAL ONCE A DAY
Qty: 90 TABLET | Refills: 1 | Status: ACTIVE | COMMUNITY
Start: 2020-11-24

## 2022-06-21 RX ORDER — OLMESARTAN MEDOXOMIL 40 MG/1
1 TABLET TABLET ORAL ONCE A DAY
Qty: 30 | Status: ACTIVE | COMMUNITY

## 2022-06-21 RX ORDER — AMLODIPINE BESYLATE 10 MG/1
1 TABLET TABLET ORAL ONCE A DAY
Qty: 30 | Status: ACTIVE | COMMUNITY

## 2022-06-21 RX ORDER — NALOXEGOL OXALATE 25 MG/1
1 TABLET IN THE MORNING TABLET, FILM COATED ORAL ONCE A DAY
Qty: 30 | Refills: 0 | OUTPATIENT

## 2022-06-21 RX ORDER — ERGOCALCIFEROL 1.25 MG/1
1 CAPSULE CAPSULE ORAL
Qty: 4 | Status: ACTIVE | COMMUNITY

## 2022-06-21 RX ORDER — HYOSCYAMINE SULFATE 0.12 MG/1
1 TABLET AS NEEDED TABLET ORAL
Qty: 60 TABLET | Refills: 1 | Status: ACTIVE | COMMUNITY
Start: 2020-11-24

## 2022-06-21 RX ORDER — FAMOTIDINE 20 MG/1
1 TABLET TABLET, FILM COATED ORAL TWICE A DAY
Qty: 60 TABLET | Refills: 0 | Status: ACTIVE | COMMUNITY
Start: 2021-09-01

## 2022-06-21 RX ORDER — NALOXEGOL OXALATE 25 MG/1
1 TABLET IN THE MORNING TABLET, FILM COATED ORAL ONCE A DAY
Qty: 30 | Refills: 0 | Status: ACTIVE | COMMUNITY

## 2022-07-06 ENCOUNTER — TELEPHONE ENCOUNTER (OUTPATIENT)
Dept: URBAN - METROPOLITAN AREA CLINIC 23 | Facility: CLINIC | Age: 52
End: 2022-07-06

## 2022-08-18 ENCOUNTER — OFFICE VISIT (OUTPATIENT)
Dept: URBAN - METROPOLITAN AREA CLINIC 33 | Facility: CLINIC | Age: 52
End: 2022-08-18
Payer: COMMERCIAL

## 2022-08-18 VITALS — WEIGHT: 173 LBS | BODY MASS INDEX: 24.22 KG/M2 | HEIGHT: 71 IN

## 2022-08-18 DIAGNOSIS — K59.03 DRUG-INDUCED CONSTIPATION: ICD-10-CM

## 2022-08-18 DIAGNOSIS — R10.10 PAIN OF UPPER ABDOMEN: ICD-10-CM

## 2022-08-18 DIAGNOSIS — K31.84 GASTROPARESIS: ICD-10-CM

## 2022-08-18 PROBLEM — 235675006: Status: ACTIVE | Noted: 2020-11-24

## 2022-08-18 PROBLEM — 21782001: Status: ACTIVE | Noted: 2022-08-18

## 2022-08-18 PROCEDURE — 99214 OFFICE O/P EST MOD 30 MIN: CPT | Performed by: INTERNAL MEDICINE

## 2022-08-18 RX ORDER — HYOSCYAMINE SULFATE 0.12 MG/1
1 TABLET AS NEEDED TABLET ORAL
Qty: 60 TABLET | Refills: 1 | Status: ON HOLD | COMMUNITY
Start: 2020-11-24

## 2022-08-18 RX ORDER — NALOXEGOL OXALATE 25 MG/1
1 TABLET IN THE MORNING TABLET, FILM COATED ORAL ONCE A DAY
Qty: 30 | Refills: 0 | Status: ON HOLD | COMMUNITY

## 2022-08-18 RX ORDER — FENTANYL 25 UG/H
1 PATCH TO SKIN PATCH TRANSDERMAL
Status: ACTIVE | COMMUNITY

## 2022-08-18 RX ORDER — ERGOCALCIFEROL 1.25 MG/1
1 CAPSULE CAPSULE ORAL
Qty: 4 | Status: ACTIVE | COMMUNITY

## 2022-08-18 RX ORDER — PANTOPRAZOLE SODIUM 40 MG/1
1 TABLET TABLET, DELAYED RELEASE ORAL ONCE A DAY
Qty: 90 TABLET | Refills: 1 | Status: ON HOLD | COMMUNITY
Start: 2020-11-24

## 2022-08-18 RX ORDER — FUROSEMIDE 40 MG/1
1 TABLET TABLET ORAL ONCE A DAY
Status: ACTIVE | COMMUNITY

## 2022-08-18 RX ORDER — OLMESARTAN MEDOXOMIL 40 MG/1
1 TABLET TABLET ORAL ONCE A DAY
Qty: 30 | Status: ACTIVE | COMMUNITY

## 2022-08-18 RX ORDER — SUCRALFATE 1 G/1
1 TABLET ON AN EMPTY STOMACH TABLET ORAL TWICE A DAY
Qty: 60 | Refills: 1 | Status: ON HOLD | COMMUNITY
Start: 2020-12-07

## 2022-08-18 RX ORDER — LEVOTHYROXINE SODIUM 150 UG/1
1 TABLET IN THE MORNING ON AN EMPTY STOMACH TABLET ORAL ONCE A DAY
Status: ACTIVE | COMMUNITY

## 2022-08-18 RX ORDER — CETIRIZINE HYDROCHLORIDE 10 MG/1
1 TABLET TABLET, FILM COATED ORAL ONCE A DAY
Qty: 30 | Status: ACTIVE | COMMUNITY

## 2022-08-18 RX ORDER — AMLODIPINE BESYLATE 10 MG/1
1 TABLET TABLET ORAL ONCE A DAY
Qty: 30 | Status: ON HOLD | COMMUNITY

## 2022-08-18 RX ORDER — FAMOTIDINE 20 MG/1
1 TABLET TABLET, FILM COATED ORAL TWICE A DAY
Qty: 60 TABLET | Refills: 0 | Status: ACTIVE | COMMUNITY
Start: 2021-09-01

## 2022-08-18 RX ORDER — LORAZEPAM 1 MG/1
1 TABLET AT BEDTIME AS NEEDED TABLET ORAL ONCE A DAY
Status: ACTIVE | COMMUNITY

## 2022-08-18 RX ORDER — DESIPRAMINE HYDROCHLORIDE 25 MG/1
1 TABLET TABLET, FILM COATED ORAL ONCE A DAY
Qty: 30 | OUTPATIENT
Start: 2022-08-18

## 2022-08-18 RX ORDER — ALBUTEROL SULFATE 2.5 MG/3ML
3 ML AS NEEDED SOLUTION RESPIRATORY (INHALATION)
Status: ACTIVE | COMMUNITY

## 2022-08-18 RX ORDER — LINACLOTIDE 72 UG/1
1 CAPSULE AT LEAST 30 MINUTES BEFORE THE FIRST MEAL OF THE DAY ON AN EMPTY STOMACH CAPSULE, GELATIN COATED ORAL ONCE A DAY
Qty: 30 | OUTPATIENT
Start: 2022-08-18 | End: 2022-09-17

## 2022-08-18 NOTE — HPI-ABDOMINAL PAIN
50 y/o female presents today with c/o abdominal pain that began 3 months. Pain is located in all 4 quadrants she states that she feels as if she is pregnant all the time. Intermittent abdominal pain and cramping symptoms are described as sharp. Symptoms will last for days some times she will use a heating pad to help relieve symptoms. Abdominal pain is worse during a Sickle Cell Crisis. She reports that she has had multiple hospital visits due to Sickle Cell Crisis. Symtoms don't decrease after a bowel movement. She also c/o SOB daily patient states that she has gained weight 20 lbs within the last 4 months.   She reports that her Bilirubin and LDH is elevated as of 8/2/2022.   Currently reports 1 bowel movement with occasional strain. Her stools are formed with blood present on occasion. She states that bright red blood is present when she wipes and sometimes within the stool. She denies any rectal pain or pruritus ani.   She admits increased bloating that she attributes to fluid retention and once she takes her Lasix she will feel better. Patient admits increased gas that is passable via belching.    Patient reports that she has tried Golytlely and it works the best she has also tried Movantik but states that it doesn't work.  She reports that stool softeners don't help.   She hasn't had an imaging completed since June.

## 2022-08-22 ENCOUNTER — OFFICE VISIT (OUTPATIENT)
Dept: URBAN - METROPOLITAN AREA CLINIC 33 | Facility: CLINIC | Age: 52
End: 2022-08-22

## 2022-09-01 ENCOUNTER — TELEPHONE ENCOUNTER (OUTPATIENT)
Dept: URBAN - METROPOLITAN AREA SURGERY CENTER 8 | Facility: SURGERY CENTER | Age: 52
End: 2022-09-01

## 2022-09-02 ENCOUNTER — TELEPHONE ENCOUNTER (OUTPATIENT)
Dept: URBAN - METROPOLITAN AREA SURGERY CENTER 8 | Facility: SURGERY CENTER | Age: 52
End: 2022-09-02

## 2022-09-06 PROBLEM — 83132003: Status: ACTIVE | Noted: 2022-08-18

## 2022-09-07 ENCOUNTER — OFFICE VISIT (OUTPATIENT)
Dept: URBAN - METROPOLITAN AREA SURGERY CENTER 8 | Facility: SURGERY CENTER | Age: 52
End: 2022-09-07

## 2022-09-12 ENCOUNTER — OFFICE VISIT (OUTPATIENT)
Dept: URBAN - METROPOLITAN AREA MEDICAL CENTER 27 | Facility: MEDICAL CENTER | Age: 52
End: 2022-09-12

## 2022-09-29 ENCOUNTER — OFFICE VISIT (OUTPATIENT)
Dept: URBAN - METROPOLITAN AREA CLINIC 33 | Facility: CLINIC | Age: 52
End: 2022-09-29

## 2022-10-14 ENCOUNTER — TELEPHONE ENCOUNTER (OUTPATIENT)
Dept: URBAN - METROPOLITAN AREA CLINIC 33 | Facility: CLINIC | Age: 52
End: 2022-10-14

## 2022-10-17 ENCOUNTER — OFFICE VISIT (OUTPATIENT)
Dept: URBAN - METROPOLITAN AREA MEDICAL CENTER 27 | Facility: MEDICAL CENTER | Age: 52
End: 2022-10-17

## 2022-11-07 ENCOUNTER — OFFICE VISIT (OUTPATIENT)
Dept: URBAN - METROPOLITAN AREA CLINIC 33 | Facility: CLINIC | Age: 52
End: 2022-11-07

## 2023-06-20 ENCOUNTER — OFFICE VISIT (OUTPATIENT)
Dept: URBAN - METROPOLITAN AREA CLINIC 35 | Facility: CLINIC | Age: 53
End: 2023-06-20

## 2023-06-26 ENCOUNTER — TELEPHONE ENCOUNTER (OUTPATIENT)
Dept: URBAN - METROPOLITAN AREA CLINIC 35 | Facility: CLINIC | Age: 53
End: 2023-06-26

## 2023-06-26 ENCOUNTER — OFFICE VISIT (OUTPATIENT)
Dept: URBAN - METROPOLITAN AREA CLINIC 35 | Facility: CLINIC | Age: 53
End: 2023-06-26

## 2023-06-26 RX ORDER — LEVOTHYROXINE SODIUM 150 UG/1
1 TABLET IN THE MORNING ON AN EMPTY STOMACH TABLET ORAL ONCE A DAY
Status: ACTIVE | COMMUNITY

## 2023-06-26 RX ORDER — FAMOTIDINE 20 MG/1
1 TABLET TABLET, FILM COATED ORAL TWICE A DAY
Qty: 60 TABLET | Refills: 0 | Status: ACTIVE | COMMUNITY
Start: 2021-09-01

## 2023-06-26 RX ORDER — SUCRALFATE 1 G/1
1 TABLET ON AN EMPTY STOMACH TABLET ORAL TWICE A DAY
Qty: 60 | Refills: 1 | Status: ON HOLD | COMMUNITY
Start: 2020-12-07

## 2023-06-26 RX ORDER — FENTANYL 25 UG/H
1 PATCH TO SKIN PATCH TRANSDERMAL
Status: ACTIVE | COMMUNITY

## 2023-06-26 RX ORDER — ERGOCALCIFEROL 1.25 MG/1
1 CAPSULE CAPSULE ORAL
Qty: 4 | Status: ACTIVE | COMMUNITY

## 2023-06-26 RX ORDER — CETIRIZINE HYDROCHLORIDE 10 MG/1
1 TABLET TABLET, FILM COATED ORAL ONCE A DAY
Qty: 30 | Status: ACTIVE | COMMUNITY

## 2023-06-26 RX ORDER — NALOXEGOL OXALATE 25 MG/1
1 TABLET IN THE MORNING TABLET, FILM COATED ORAL ONCE A DAY
Qty: 30 | Refills: 0 | Status: ON HOLD | COMMUNITY

## 2023-06-26 RX ORDER — DESIPRAMINE HYDROCHLORIDE 25 MG/1
1 TABLET TABLET, FILM COATED ORAL ONCE A DAY
Qty: 30 | Status: ACTIVE | COMMUNITY
Start: 2022-08-18

## 2023-06-26 RX ORDER — OLMESARTAN MEDOXOMIL 40 MG/1
1 TABLET TABLET ORAL ONCE A DAY
Qty: 30 | Status: ACTIVE | COMMUNITY

## 2023-06-26 RX ORDER — PANTOPRAZOLE SODIUM 40 MG/1
1 TABLET TABLET, DELAYED RELEASE ORAL ONCE A DAY
Qty: 90 TABLET | Refills: 1 | Status: ON HOLD | COMMUNITY
Start: 2020-11-24

## 2023-06-26 RX ORDER — FUROSEMIDE 40 MG/1
1 TABLET TABLET ORAL ONCE A DAY
Status: ACTIVE | COMMUNITY

## 2023-06-26 RX ORDER — HYOSCYAMINE SULFATE 0.12 MG/1
1 TABLET AS NEEDED TABLET ORAL
Qty: 60 TABLET | Refills: 1 | Status: ON HOLD | COMMUNITY
Start: 2020-11-24

## 2023-06-26 RX ORDER — AMLODIPINE BESYLATE 10 MG/1
1 TABLET TABLET ORAL ONCE A DAY
Qty: 30 | Status: ON HOLD | COMMUNITY

## 2023-06-26 RX ORDER — LORAZEPAM 1 MG/1
1 TABLET AT BEDTIME AS NEEDED TABLET ORAL ONCE A DAY
Status: ACTIVE | COMMUNITY

## 2023-06-26 RX ORDER — ALBUTEROL SULFATE 2.5 MG/3ML
3 ML AS NEEDED SOLUTION RESPIRATORY (INHALATION)
Status: ACTIVE | COMMUNITY

## 2023-06-26 NOTE — HPI-RECTAL BLEEDING
52 Year old female patient presents today for a consultation about Rectal Bleeding. Bleeding started -- and happens -- times a --. The blood is -- in color and is present on tissue and within the water bowl, enough to stain the water red. Patient currently admits -- bowel movements per --, with/without strain. Her stools are -- and --. She admits/denies any mucus or melena in stools. She admits/denies any rectal pain or pruritus ani. Admits/denies any associated abdominal pain or cramping.

## 2023-08-08 ENCOUNTER — WEB ENCOUNTER (OUTPATIENT)
Dept: URBAN - METROPOLITAN AREA CLINIC 35 | Facility: CLINIC | Age: 53
End: 2023-08-08

## 2023-08-08 ENCOUNTER — OFFICE VISIT (OUTPATIENT)
Dept: URBAN - METROPOLITAN AREA CLINIC 31 | Facility: CLINIC | Age: 53
End: 2023-08-08
Payer: COMMERCIAL

## 2023-08-08 VITALS
HEART RATE: 77 BPM | DIASTOLIC BLOOD PRESSURE: 92 MMHG | HEIGHT: 71 IN | SYSTOLIC BLOOD PRESSURE: 154 MMHG | WEIGHT: 180 LBS | BODY MASS INDEX: 25.2 KG/M2 | OXYGEN SATURATION: 98 %

## 2023-08-08 DIAGNOSIS — K92.1 BLOOD IN STOOL: ICD-10-CM

## 2023-08-08 DIAGNOSIS — K59.01 SLOW TRANSIT CONSTIPATION: ICD-10-CM

## 2023-08-08 DIAGNOSIS — K60.2 ANAL FISSURE: ICD-10-CM

## 2023-08-08 DIAGNOSIS — Z12.11 ENCOUNTER FOR COLORECTAL CANCER SCREENING: ICD-10-CM

## 2023-08-08 DIAGNOSIS — D57.00 SICKLE CELL DISEASE WITH CRISIS: ICD-10-CM

## 2023-08-08 PROBLEM — 417279003: Status: ACTIVE | Noted: 2023-08-08

## 2023-08-08 PROBLEM — 35298007: Status: ACTIVE | Noted: 2023-08-08

## 2023-08-08 PROBLEM — 30037006: Status: ACTIVE | Noted: 2023-08-08

## 2023-08-08 PROCEDURE — 99214 OFFICE O/P EST MOD 30 MIN: CPT | Performed by: INTERNAL MEDICINE

## 2023-08-08 RX ORDER — ERGOCALCIFEROL 1.25 MG/1
1 CAPSULE CAPSULE ORAL
Qty: 4 | Status: ACTIVE | COMMUNITY

## 2023-08-08 RX ORDER — SUCRALFATE 1 G/1
1 TABLET ON AN EMPTY STOMACH TABLET ORAL TWICE A DAY
Qty: 60 | Refills: 1 | Status: ON HOLD | COMMUNITY
Start: 2020-12-07

## 2023-08-08 RX ORDER — HYOSCYAMINE SULFATE 0.12 MG/1
1 TABLET AS NEEDED TABLET ORAL
Qty: 60 TABLET | Refills: 1 | Status: ON HOLD | COMMUNITY
Start: 2020-11-24

## 2023-08-08 RX ORDER — FENTANYL 25 UG/H
1 PATCH TO SKIN PATCH TRANSDERMAL
Status: ACTIVE | COMMUNITY

## 2023-08-08 RX ORDER — ERGOCALCIFEROL 1.25 MG/1
1 CAPSULE CAPSULE ORAL
Status: ACTIVE | COMMUNITY

## 2023-08-08 RX ORDER — AMLODIPINE BESYLATE 10 MG/1
1 TABLET TABLET ORAL ONCE A DAY
Qty: 30 | Status: ON HOLD | COMMUNITY

## 2023-08-08 RX ORDER — DESIPRAMINE HYDROCHLORIDE 25 MG/1
1 TABLET TABLET, FILM COATED ORAL ONCE A DAY
Qty: 30 | Status: ON HOLD | COMMUNITY
Start: 2022-08-18

## 2023-08-08 RX ORDER — LORAZEPAM 1 MG/1
1 TABLET AT BEDTIME AS NEEDED TABLET ORAL ONCE A DAY
Status: ACTIVE | COMMUNITY

## 2023-08-08 RX ORDER — LEVOTHYROXINE SODIUM 150 UG/1
1 TABLET IN THE MORNING ON AN EMPTY STOMACH TABLET ORAL ONCE A DAY
Status: ACTIVE | COMMUNITY

## 2023-08-08 RX ORDER — PANTOPRAZOLE SODIUM 40 MG/1
1 TABLET TABLET, DELAYED RELEASE ORAL ONCE A DAY
Qty: 90 TABLET | Refills: 1 | Status: ON HOLD | COMMUNITY
Start: 2020-11-24

## 2023-08-08 RX ORDER — SODIUM PICOSULFATE, MAGNESIUM OXIDE, AND ANHYDROUS CITRIC ACID 12; 3.5; 1 G/175ML; G/175ML; MG/175ML
175 ML THE FIRST DOSE THE EVENING BEFORE AND SECOND DOSE THE MORNING OF COLONOSCOPY LIQUID ORAL ONCE A DAY
Qty: 350 MILLILITER | OUTPATIENT
Start: 2023-08-08 | End: 2023-08-10

## 2023-08-08 RX ORDER — FUROSEMIDE 40 MG/1
1 TABLET TABLET ORAL ONCE A DAY
Status: ACTIVE | COMMUNITY

## 2023-08-08 RX ORDER — ALBUTEROL SULFATE 2.5 MG/3ML
3 ML AS NEEDED SOLUTION RESPIRATORY (INHALATION)
Status: ACTIVE | COMMUNITY

## 2023-08-08 RX ORDER — FAMOTIDINE 20 MG/1
1 TABLET AT BEDTIME AS NEEDED TABLET, FILM COATED ORAL TWICE A DAY
Status: ACTIVE | COMMUNITY

## 2023-08-08 RX ORDER — FAMOTIDINE 20 MG/1
1 TABLET TABLET, FILM COATED ORAL TWICE A DAY
Qty: 60 TABLET | Refills: 0 | Status: ACTIVE | COMMUNITY
Start: 2021-09-01

## 2023-08-08 RX ORDER — NIFEDIPINE
PEA SIZE AMOUNT POWDER (GRAM) MISCELLANEOUS
Qty: 30 GRAMS | Refills: 1 | OUTPATIENT
Start: 2023-08-08 | End: 2023-12-05

## 2023-08-08 RX ORDER — OLMESARTAN MEDOXOMIL 40 MG/1
1 TABLET TABLET ORAL ONCE A DAY
Qty: 30 | Status: ACTIVE | COMMUNITY

## 2023-08-08 RX ORDER — NALOXEGOL OXALATE 25 MG/1
1 TABLET IN THE MORNING TABLET, FILM COATED ORAL ONCE A DAY
Qty: 30 | Refills: 0 | Status: ON HOLD | COMMUNITY

## 2023-08-08 RX ORDER — LINACLOTIDE 72 UG/1
1 CAPSULE AT LEAST 30 MINUTES BEFORE THE FIRST MEAL OF THE DAY ON AN EMPTY STOMACH CAPSULE, GELATIN COATED ORAL ONCE A DAY
Status: ACTIVE | COMMUNITY

## 2023-08-08 RX ORDER — LINACLOTIDE 145 UG/1
1 CAPSULE AT LEAST 30 MINUTES BEFORE THE FIRST MEAL OF THE DAY ON AN EMPTY STOMACH CAPSULE, GELATIN COATED ORAL ONCE A DAY
Qty: 30 | OUTPATIENT
Start: 2023-08-08 | End: 2023-09-07

## 2023-08-08 NOTE — HPI-EARLY SATIETY
Patient admits early satiety. She is not completing her meals. Patient denies nausea and vomiting. If she tried to complete a meal, she admits feeling "stuffed, like her organs are pushing against one another". She denies unintentional weight loss. Patient had an EGD done with Dr. Beck on 12/07/2020. Patient denies a family history of gastric cancer.

## 2023-08-08 NOTE — PHYSICAL EXAM GASTROINTESTINAL
Abdomen , soft, nontender, nondistended , no guarding or rigidity , no masses palpable , normal bowel sounds , Liver and Spleen,  no hepatosplenomegaly , liver nontender. Anal fissure - tender

## 2023-08-08 NOTE — HPI-COLORECTAL CANCER SCREENING
Patient presents today for a colorectal cancer screening.  Patient currently admits 1 "incomplete" bowel movement per day, with intermittent strain. She admits both melena and blood in her stool. Patient denies mucus in her stool. Patient admits a family history of colon cancer. Her brother passed away from colon cancer at age 54.

## 2023-08-08 NOTE — HPI-BLOOD IN STOOL
52 year old female patient presents today for a consultation for rectal bleeding. Bleeding started 4 months ago and happens "a few times a week". The blood is BOTH jet black and bright red, and is present on tissue and within the water bowl, enough to stain the water red. Patient states that she believes she feels a hemorrhoid when she has rectal pain. She has been using preparation H, which only helps with the rectal burning and irritation, temporarily. She admits melena. Patient currently admits 1 "incomplete" bowel movement per day, with intermittent strain. She uses Linzess 72 mcg, just as needed. Her stools are "formed but short and incomplete". If she takes the Linzess daily, her stools become too loose. She denies any mucus in her stool. She admits rectal pain or pruritus ani. Admits associated abdominal pain when she is bloated. Patient states that her brother passed away from colon cancer at age 54.

## 2023-08-24 ENCOUNTER — TELEPHONE ENCOUNTER (OUTPATIENT)
Dept: URBAN - METROPOLITAN AREA SURGERY CENTER 8 | Facility: SURGERY CENTER | Age: 53
End: 2023-08-24

## 2023-08-25 ENCOUNTER — TELEPHONE ENCOUNTER (OUTPATIENT)
Dept: URBAN - METROPOLITAN AREA CLINIC 33 | Facility: CLINIC | Age: 53
End: 2023-08-25

## 2023-08-30 ENCOUNTER — OFFICE VISIT (OUTPATIENT)
Dept: URBAN - METROPOLITAN AREA SURGERY CENTER 8 | Facility: SURGERY CENTER | Age: 53
End: 2023-08-30

## 2023-09-14 ENCOUNTER — OFFICE VISIT (OUTPATIENT)
Dept: URBAN - METROPOLITAN AREA CLINIC 33 | Facility: CLINIC | Age: 53
End: 2023-09-14

## 2023-11-10 ENCOUNTER — TELEPHONE ENCOUNTER (OUTPATIENT)
Dept: URBAN - METROPOLITAN AREA CLINIC 31 | Facility: CLINIC | Age: 53
End: 2023-11-10

## 2023-11-13 ENCOUNTER — CLAIMS CREATED FROM THE CLAIM WINDOW (OUTPATIENT)
Dept: URBAN - METROPOLITAN AREA MEDICAL CENTER 27 | Facility: MEDICAL CENTER | Age: 53
End: 2023-11-13
Payer: COMMERCIAL

## 2023-11-13 ENCOUNTER — OFFICE VISIT (OUTPATIENT)
Dept: URBAN - METROPOLITAN AREA MEDICAL CENTER 27 | Facility: MEDICAL CENTER | Age: 53
End: 2023-11-13

## 2023-11-13 DIAGNOSIS — Z12.11 COLON CANCER SCREENING: ICD-10-CM

## 2023-11-13 PROCEDURE — 992 NON-BILLABLE: Performed by: INTERNAL MEDICINE

## 2023-12-12 ENCOUNTER — TELEPHONE ENCOUNTER (OUTPATIENT)
Dept: URBAN - METROPOLITAN AREA CLINIC 35 | Facility: CLINIC | Age: 53
End: 2023-12-12

## 2023-12-14 ENCOUNTER — OFFICE VISIT (OUTPATIENT)
Dept: URBAN - METROPOLITAN AREA CLINIC 33 | Facility: CLINIC | Age: 53
End: 2023-12-14

## 2023-12-15 ENCOUNTER — OFFICE VISIT (OUTPATIENT)
Dept: URBAN - METROPOLITAN AREA SURGERY CENTER 8 | Facility: SURGERY CENTER | Age: 53
End: 2023-12-15

## 2024-03-25 ENCOUNTER — COLON (OUTPATIENT)
Dept: URBAN - METROPOLITAN AREA MEDICAL CENTER 27 | Facility: MEDICAL CENTER | Age: 54
End: 2024-03-25

## 2024-04-08 ENCOUNTER — COLON (OUTPATIENT)
Dept: URBAN - METROPOLITAN AREA MEDICAL CENTER 27 | Facility: MEDICAL CENTER | Age: 54
End: 2024-04-08

## 2024-04-08 DIAGNOSIS — Z12.11 COLON CANCER SCREENING: ICD-10-CM

## 2024-04-08 DIAGNOSIS — D12.8 ADENOMATOUS POLYP OF RECTUM: ICD-10-CM

## 2024-04-08 PROCEDURE — 45385 COLONOSCOPY W/LESION REMOVAL: CPT | Performed by: INTERNAL MEDICINE

## 2024-04-11 ENCOUNTER — OV EP (OUTPATIENT)
Dept: URBAN - METROPOLITAN AREA CLINIC 33 | Facility: CLINIC | Age: 54
End: 2024-04-11

## 2024-04-25 ENCOUNTER — OV EP (OUTPATIENT)
Dept: URBAN - METROPOLITAN AREA CLINIC 33 | Facility: CLINIC | Age: 54
End: 2024-04-25

## 2024-04-25 RX ORDER — ERGOCALCIFEROL 1.25 MG/1
1 CAPSULE CAPSULE ORAL
Status: ACTIVE | COMMUNITY

## 2024-04-25 RX ORDER — FENTANYL 25 UG/H
1 PATCH TO SKIN PATCH TRANSDERMAL
Status: ACTIVE | COMMUNITY

## 2024-04-25 RX ORDER — ERGOCALCIFEROL 1.25 MG/1
1 CAPSULE CAPSULE ORAL
Qty: 4 | Status: ACTIVE | COMMUNITY

## 2024-04-25 RX ORDER — SUCRALFATE 1 G/1
1 TABLET ON AN EMPTY STOMACH TABLET ORAL TWICE A DAY
Qty: 60 | Refills: 1 | Status: ON HOLD | COMMUNITY
Start: 2020-12-07

## 2024-04-25 RX ORDER — DESIPRAMINE HYDROCHLORIDE 25 MG/1
1 TABLET TABLET ORAL ONCE A DAY
Qty: 30 | Status: ON HOLD | COMMUNITY
Start: 2022-08-18

## 2024-04-25 RX ORDER — LEVOTHYROXINE SODIUM 150 UG/1
1 TABLET IN THE MORNING ON AN EMPTY STOMACH TABLET ORAL ONCE A DAY
Status: ACTIVE | COMMUNITY

## 2024-04-25 RX ORDER — OLMESARTAN MEDOXOMIL 40 MG/1
1 TABLET TABLET ORAL ONCE A DAY
Qty: 30 | Status: ACTIVE | COMMUNITY

## 2024-04-25 RX ORDER — AMLODIPINE BESYLATE 10 MG/1
1 TABLET TABLET ORAL ONCE A DAY
Qty: 30 | Status: ON HOLD | COMMUNITY

## 2024-04-25 RX ORDER — HYOSCYAMINE SULFATE 0.12 MG/1
1 TABLET AS NEEDED TABLET ORAL
Qty: 60 TABLET | Refills: 1 | Status: ON HOLD | COMMUNITY
Start: 2020-11-24

## 2024-04-25 RX ORDER — FUROSEMIDE 40 MG/1
1 TABLET TABLET ORAL ONCE A DAY
Status: ACTIVE | COMMUNITY

## 2024-04-25 RX ORDER — LINACLOTIDE 72 UG/1
1 CAPSULE AT LEAST 30 MINUTES BEFORE THE FIRST MEAL OF THE DAY ON AN EMPTY STOMACH CAPSULE, GELATIN COATED ORAL ONCE A DAY
Status: ACTIVE | COMMUNITY

## 2024-04-25 RX ORDER — PANTOPRAZOLE SODIUM 40 MG/1
1 TABLET TABLET, DELAYED RELEASE ORAL ONCE A DAY
Qty: 90 TABLET | Refills: 1 | Status: ON HOLD | COMMUNITY
Start: 2020-11-24

## 2024-04-25 RX ORDER — LORAZEPAM 1 MG/1
1 TABLET AT BEDTIME AS NEEDED TABLET ORAL ONCE A DAY
Status: ACTIVE | COMMUNITY

## 2024-04-25 RX ORDER — NALOXEGOL OXALATE 25 MG/1
1 TABLET IN THE MORNING TABLET, FILM COATED ORAL ONCE A DAY
Qty: 30 | Refills: 0 | Status: ON HOLD | COMMUNITY

## 2024-04-25 RX ORDER — FAMOTIDINE 20 MG/1
1 TABLET TABLET, FILM COATED ORAL TWICE A DAY
Qty: 60 TABLET | Refills: 0 | Status: ACTIVE | COMMUNITY
Start: 2021-09-01

## 2024-04-25 RX ORDER — FAMOTIDINE 20 MG/1
1 TABLET AT BEDTIME AS NEEDED TABLET, FILM COATED ORAL TWICE A DAY
Status: ACTIVE | COMMUNITY

## 2024-04-25 RX ORDER — ALBUTEROL SULFATE 2.5 MG/3ML
3 ML AS NEEDED SOLUTION RESPIRATORY (INHALATION)
Status: ACTIVE | COMMUNITY

## 2024-04-25 NOTE — HPI-COLONOSCOPY FOLLOWUP
Patient presents today for a follow-up from the colonoscopy that was done on 04/08/2024. Since having the procedure, patient is having --- bowel movements per --. Stools are --- with/out the presence of blood, mucus, or melena. Patient admits/denies pruritus ani or rectal pain. Patient admits/denies any complications following the procedure.

## 2024-04-25 NOTE — HPI-BLOOD IN STOOL
Patient presents today for a follow-up for rectal bleeding. She admits/denies improvement in the rectal bleeding, and the last episode was on ---. Patient admits/denies the continuation of Linzess 72 mcg, just as needed. She is currenlty having --- bowel movements per day with/out strain. Her stools are ---, with/out the presence of blood, mucus, or melena. Patient admits/denies rectal pain or pruritus ani. Of note, lab results from Dr. Barriga were already obtained and scanned into EMR.   Last visit (08/08/2023) 52 year old female patient presents today for a consultation for rectal bleeding. Bleeding started 4 months ago and happens "a few times a week". The blood is BOTH jet black and bright red, and is present on tissue and within the water bowl, enough to stain the water red. Patient states that she believes she feels a hemorrhoid when she has rectal pain. She has been using preparation H, which only helps with the rectal burning and irritation, temporarily. She admits melena. Patient currently admits 1 "incomplete" bowel movement per day, with intermittent strain. She uses Linzess 72 mcg, just as needed. Her stools are "formed but short and incomplete". If she takes the Linzess daily, her stools become too loose. She denies any mucus in her stool. She admits rectal pain or pruritus ani. Admits associated abdominal pain when she is bloated. Patient states that her brother passed away from colon cancer at age 54.

## 2024-04-25 NOTE — HPI-EARLY SATIETY
Patient admits/denies following the gastroparesis diet. She admits/denies improvement in the early satiety. She admits/denies nausea and vomiting. Patient admits/denies unintentional weight loss. She reports ---.   Last visit (08/08/2023) Patient admits early satiety. She is not completing her meals. Patient denies nausea and vomiting. If she tried to complete a meal, she admits feeling "stuffed, like her organs are pushing against one another". She denies unintentional weight loss. Patient had an EGD done with Dr. Beck on 12/07/2020. Patient denies a family history of gastric cancer.

## 2024-04-30 ENCOUNTER — OV EP (OUTPATIENT)
Dept: URBAN - METROPOLITAN AREA CLINIC 35 | Facility: CLINIC | Age: 54
End: 2024-04-30

## 2024-04-30 RX ORDER — AMLODIPINE BESYLATE 10 MG/1
1 TABLET TABLET ORAL ONCE A DAY
Qty: 30 | Status: ON HOLD | COMMUNITY

## 2024-04-30 RX ORDER — FENTANYL 25 UG/H
1 PATCH TO SKIN PATCH TRANSDERMAL
COMMUNITY

## 2024-04-30 RX ORDER — ERGOCALCIFEROL 1.25 MG/1
1 CAPSULE CAPSULE ORAL
COMMUNITY

## 2024-04-30 RX ORDER — FAMOTIDINE 20 MG/1
1 TABLET AT BEDTIME AS NEEDED TABLET, FILM COATED ORAL TWICE A DAY
COMMUNITY

## 2024-04-30 RX ORDER — FAMOTIDINE 20 MG/1
1 TABLET TABLET, FILM COATED ORAL TWICE A DAY
Qty: 60 TABLET | Refills: 0 | COMMUNITY
Start: 2021-09-01

## 2024-04-30 RX ORDER — FUROSEMIDE 40 MG/1
1 TABLET TABLET ORAL ONCE A DAY
COMMUNITY

## 2024-04-30 RX ORDER — LEVOTHYROXINE SODIUM 150 UG/1
1 TABLET IN THE MORNING ON AN EMPTY STOMACH TABLET ORAL ONCE A DAY
COMMUNITY

## 2024-04-30 RX ORDER — ERGOCALCIFEROL 1.25 MG/1
1 CAPSULE CAPSULE ORAL
Qty: 4 | COMMUNITY

## 2024-04-30 RX ORDER — HYOSCYAMINE SULFATE 0.12 MG/1
1 TABLET AS NEEDED TABLET ORAL
Qty: 60 TABLET | Refills: 1 | Status: ON HOLD | COMMUNITY
Start: 2020-11-24

## 2024-04-30 RX ORDER — LORAZEPAM 1 MG/1
1 TABLET AT BEDTIME AS NEEDED TABLET ORAL ONCE A DAY
COMMUNITY

## 2024-04-30 RX ORDER — DESIPRAMINE HYDROCHLORIDE 25 MG/1
1 TABLET TABLET ORAL ONCE A DAY
Qty: 30 | Status: ON HOLD | COMMUNITY
Start: 2022-08-18

## 2024-04-30 RX ORDER — PANTOPRAZOLE SODIUM 40 MG/1
1 TABLET TABLET, DELAYED RELEASE ORAL ONCE A DAY
Qty: 90 TABLET | Refills: 1 | Status: ON HOLD | COMMUNITY
Start: 2020-11-24

## 2024-04-30 RX ORDER — ALBUTEROL SULFATE 2.5 MG/3ML
3 ML AS NEEDED SOLUTION RESPIRATORY (INHALATION)
COMMUNITY

## 2024-04-30 RX ORDER — NALOXEGOL OXALATE 25 MG/1
1 TABLET IN THE MORNING TABLET, FILM COATED ORAL ONCE A DAY
Qty: 30 | Refills: 0 | Status: ON HOLD | COMMUNITY

## 2024-04-30 RX ORDER — SUCRALFATE 1 G/1
1 TABLET ON AN EMPTY STOMACH TABLET ORAL TWICE A DAY
Qty: 60 | Refills: 1 | Status: ON HOLD | COMMUNITY
Start: 2020-12-07

## 2024-04-30 RX ORDER — LINACLOTIDE 72 UG/1
1 CAPSULE AT LEAST 30 MINUTES BEFORE THE FIRST MEAL OF THE DAY ON AN EMPTY STOMACH CAPSULE, GELATIN COATED ORAL ONCE A DAY
COMMUNITY

## 2024-04-30 RX ORDER — OLMESARTAN MEDOXOMIL 40 MG/1
1 TABLET TABLET ORAL ONCE A DAY
Qty: 30 | COMMUNITY

## 2024-04-30 NOTE — HPI-BLOOD IN STOOL
She admits/denies any improvement with her symptoms of rectal bleeding. Patient states since being on Linzess 145mcg she has/not had any significant improvement with her bowel habits. She reports her last episode of rectal bleeding was _ and has occured _. Currently having _ bowel movement per day, with/out strain. Stools are _ with/out the presence of blood, mucus, and melena. When patient has a bowel movement she does/not feel like she is completely emptying her bowels. She admits/denies any pruritus ani or rectal pain.    Most recent labs completed on 04.23.2024 revealed Iron Binding Capacity: 193L, %Saturation: 56H, Calcium: 8.4L, Bilirubin,Total: 3.0H, Transferrin: 132L, Ferritin: 3292H, all other labs were normal.      Last visit (08/08/2023)  52 year old female patient presents today for a consultation for rectal bleeding. Bleeding started 4 months ago and happens "a few times a week". The blood is BOTH jet black and bright red, and is present on tissue and within the water bowl, enough to stain the water red. Patient states that she believes she feels a hemorrhoid when she has rectal pain. She has been using preparation H, which only helps with the rectal burning and irritation, temporarily. She admits melena. Patient currently admits 1 "incomplete" bowel movement per day, with intermittent strain. She uses Linzess 72 mcg, just as needed. Her stools are "formed but short and incomplete". If she takes the Linzess daily, her stools become too loose. She denies any mucus in her stool. She admits rectal pain or pruritus ani. Admits associated abdominal pain when she is bloated. Patient states that her brother passed away from colon cancer at age 54.

## 2024-04-30 NOTE — HPI-EARLY SATIETY
She admits/denies improvement in the early satiety. She admits/denies any associated symptoms of unintentional weight loss. Patient reports ---.   Last visit (08/08/2023) Patient admits early satiety. She is not completing her meals. Patient denies nausea and vomiting. If she tried to complete a meal, she admits feeling "stuffed, like her organs are pushing against one another". She denies unintentional weight loss. Patient had an EGD done with Dr. Beck on 12/07/2020. Patient denies a family history of gastric cancer.

## 2024-04-30 NOTE — HPI-COLONOSCOPY FOLLOWUP
Patient presents today for a follow-up from the colonoscopy. She admits/denies any complications after her procedure. Currently reports -- bowel movements per day, with/out strain. Her stools are -- with/out the presence of blood, mucus, and melena. Admits/Denies any pruritus ani or rectal pain.

## 2024-06-07 ENCOUNTER — DASHBOARD ENCOUNTERS (OUTPATIENT)
Age: 54
End: 2024-06-07

## 2024-06-16 ENCOUNTER — CLAIMS CREATED FROM THE CLAIM WINDOW (OUTPATIENT)
Dept: URBAN - METROPOLITAN AREA MEDICAL CENTER 27 | Facility: MEDICAL CENTER | Age: 54
End: 2024-06-16
Payer: COMMERCIAL

## 2024-06-16 DIAGNOSIS — R10.13 ABDOMINAL DISCOMFORT, EPIGASTRIC: ICD-10-CM

## 2024-06-16 DIAGNOSIS — D64.89 ANEMIA DUE TO OTHER CAUSE: ICD-10-CM

## 2024-06-16 DIAGNOSIS — R11.2 ACUTE NAUSEA WITH NONBILIOUS VOMITING: ICD-10-CM

## 2024-06-16 DIAGNOSIS — R19.7 ACUTE DIARRHEA: ICD-10-CM

## 2024-06-16 PROCEDURE — 99254 IP/OBS CNSLTJ NEW/EST MOD 60: CPT | Performed by: INTERNAL MEDICINE

## 2024-06-16 PROCEDURE — 99222 1ST HOSP IP/OBS MODERATE 55: CPT | Performed by: INTERNAL MEDICINE

## 2024-06-16 PROCEDURE — G8427 DOCREV CUR MEDS BY ELIG CLIN: HCPCS | Performed by: INTERNAL MEDICINE

## 2024-06-17 ENCOUNTER — OFFICE VISIT (OUTPATIENT)
Dept: URBAN - METROPOLITAN AREA CLINIC 33 | Facility: CLINIC | Age: 54
End: 2024-06-17

## 2024-06-17 ENCOUNTER — CLAIMS CREATED FROM THE CLAIM WINDOW (OUTPATIENT)
Dept: URBAN - METROPOLITAN AREA MEDICAL CENTER 27 | Facility: MEDICAL CENTER | Age: 54
End: 2024-06-17
Payer: COMMERCIAL

## 2024-06-17 DIAGNOSIS — R11.2 ACUTE NAUSEA WITH NONBILIOUS VOMITING: ICD-10-CM

## 2024-06-17 DIAGNOSIS — R10.13 ABDOMINAL DISCOMFORT, EPIGASTRIC: ICD-10-CM

## 2024-06-17 DIAGNOSIS — R68.81 EARLY SATIETY: ICD-10-CM

## 2024-06-17 PROCEDURE — 43235 EGD DIAGNOSTIC BRUSH WASH: CPT | Performed by: INTERNAL MEDICINE

## 2024-06-17 RX ORDER — NALOXEGOL OXALATE 25 MG/1
1 TABLET IN THE MORNING TABLET, FILM COATED ORAL ONCE A DAY
Qty: 30 | Refills: 0 | Status: ON HOLD | COMMUNITY

## 2024-06-17 RX ORDER — DESIPRAMINE HYDROCHLORIDE 25 MG/1
1 TABLET TABLET ORAL ONCE A DAY
Qty: 30 | Status: ON HOLD | COMMUNITY
Start: 2022-08-18

## 2024-06-17 RX ORDER — LEVOTHYROXINE SODIUM 150 UG/1
1 TABLET IN THE MORNING ON AN EMPTY STOMACH TABLET ORAL ONCE A DAY
COMMUNITY

## 2024-06-17 RX ORDER — ERGOCALCIFEROL 1.25 MG/1
1 CAPSULE CAPSULE ORAL
Qty: 4 | COMMUNITY

## 2024-06-17 RX ORDER — AMLODIPINE BESYLATE 10 MG/1
1 TABLET TABLET ORAL ONCE A DAY
Qty: 30 | Status: ON HOLD | COMMUNITY

## 2024-06-17 RX ORDER — ERGOCALCIFEROL 1.25 MG/1
1 CAPSULE CAPSULE ORAL
COMMUNITY

## 2024-06-17 RX ORDER — LINACLOTIDE 72 UG/1
1 CAPSULE AT LEAST 30 MINUTES BEFORE THE FIRST MEAL OF THE DAY ON AN EMPTY STOMACH CAPSULE, GELATIN COATED ORAL ONCE A DAY
COMMUNITY

## 2024-06-17 RX ORDER — HYOSCYAMINE SULFATE 0.12 MG/1
1 TABLET AS NEEDED TABLET ORAL
Qty: 60 TABLET | Refills: 1 | Status: ON HOLD | COMMUNITY
Start: 2020-11-24

## 2024-06-17 RX ORDER — SUCRALFATE 1 G/1
1 TABLET ON AN EMPTY STOMACH TABLET ORAL TWICE A DAY
Qty: 60 | Refills: 1 | Status: ON HOLD | COMMUNITY
Start: 2020-12-07

## 2024-06-17 RX ORDER — FENTANYL 25 UG/H
1 PATCH TO SKIN PATCH TRANSDERMAL
COMMUNITY

## 2024-06-17 RX ORDER — OLMESARTAN MEDOXOMIL 40 MG/1
1 TABLET TABLET ORAL ONCE A DAY
Qty: 30 | COMMUNITY

## 2024-06-17 RX ORDER — FUROSEMIDE 40 MG/1
1 TABLET TABLET ORAL ONCE A DAY
COMMUNITY

## 2024-06-17 RX ORDER — ALBUTEROL SULFATE 2.5 MG/3ML
3 ML AS NEEDED SOLUTION RESPIRATORY (INHALATION)
COMMUNITY

## 2024-06-17 RX ORDER — FAMOTIDINE 20 MG/1
1 TABLET AT BEDTIME AS NEEDED TABLET, FILM COATED ORAL TWICE A DAY
COMMUNITY

## 2024-06-17 RX ORDER — LORAZEPAM 1 MG/1
1 TABLET AT BEDTIME AS NEEDED TABLET ORAL ONCE A DAY
COMMUNITY

## 2024-06-17 RX ORDER — PANTOPRAZOLE SODIUM 40 MG/1
1 TABLET TABLET, DELAYED RELEASE ORAL ONCE A DAY
Qty: 90 TABLET | Refills: 1 | Status: ON HOLD | COMMUNITY
Start: 2020-11-24

## 2024-06-17 RX ORDER — FAMOTIDINE 20 MG/1
1 TABLET TABLET, FILM COATED ORAL TWICE A DAY
Qty: 60 TABLET | Refills: 0 | COMMUNITY
Start: 2021-09-01

## 2024-06-18 ENCOUNTER — CLAIMS CREATED FROM THE CLAIM WINDOW (OUTPATIENT)
Dept: URBAN - METROPOLITAN AREA MEDICAL CENTER 27 | Facility: MEDICAL CENTER | Age: 54
End: 2024-06-18
Payer: COMMERCIAL

## 2024-06-18 DIAGNOSIS — R11.2 ACUTE NAUSEA WITH NONBILIOUS VOMITING: ICD-10-CM

## 2024-06-18 DIAGNOSIS — K21.00 ALKALINE REFLUX ESOPHAGITIS: ICD-10-CM

## 2024-06-18 DIAGNOSIS — K31.89 OTHER DISEASES OF STOMACH AND DUODENUM: ICD-10-CM

## 2024-06-18 DIAGNOSIS — K29.60 ADENOPAPILLOMATOSIS GASTRICA: ICD-10-CM

## 2024-06-18 PROCEDURE — 43239 EGD BIOPSY SINGLE/MULTIPLE: CPT | Performed by: INTERNAL MEDICINE

## 2024-06-18 PROCEDURE — 43235 EGD DIAGNOSTIC BRUSH WASH: CPT | Performed by: INTERNAL MEDICINE

## 2024-06-19 ENCOUNTER — CLAIMS CREATED FROM THE CLAIM WINDOW (OUTPATIENT)
Dept: URBAN - METROPOLITAN AREA MEDICAL CENTER 27 | Facility: MEDICAL CENTER | Age: 54
End: 2024-06-19
Payer: COMMERCIAL

## 2024-06-19 DIAGNOSIS — D64.89 ANEMIA DUE TO OTHER CAUSE: ICD-10-CM

## 2024-06-19 DIAGNOSIS — K31.84 DECREASED MOTILITY OF STOMACH: ICD-10-CM

## 2024-06-19 DIAGNOSIS — R19.7 ACUTE DIARRHEA: ICD-10-CM

## 2024-06-19 DIAGNOSIS — R13.10 ABNORMAL DEGLUTITION: ICD-10-CM

## 2024-06-19 PROCEDURE — 99232 SBSQ HOSP IP/OBS MODERATE 35: CPT | Performed by: INTERNAL MEDICINE

## 2024-06-20 ENCOUNTER — CLAIMS CREATED FROM THE CLAIM WINDOW (OUTPATIENT)
Dept: URBAN - METROPOLITAN AREA MEDICAL CENTER 27 | Facility: MEDICAL CENTER | Age: 54
End: 2024-06-20
Payer: COMMERCIAL

## 2024-06-20 DIAGNOSIS — K31.84 DECREASED MOTILITY OF STOMACH: ICD-10-CM

## 2024-06-20 DIAGNOSIS — E80.6 ABNORMAL BILIRUBIN TEST: ICD-10-CM

## 2024-06-20 DIAGNOSIS — R13.10 ABNORMAL DEGLUTITION: ICD-10-CM

## 2024-06-20 DIAGNOSIS — K21.00 ALKALINE REFLUX ESOPHAGITIS: ICD-10-CM

## 2024-06-20 PROCEDURE — 99232 SBSQ HOSP IP/OBS MODERATE 35: CPT | Performed by: INTERNAL MEDICINE

## 2024-07-01 ENCOUNTER — TELEPHONE ENCOUNTER (OUTPATIENT)
Dept: URBAN - METROPOLITAN AREA CLINIC 33 | Facility: CLINIC | Age: 54
End: 2024-07-01

## 2024-07-03 ENCOUNTER — TELEPHONE ENCOUNTER (OUTPATIENT)
Dept: URBAN - METROPOLITAN AREA CLINIC 33 | Facility: CLINIC | Age: 54
End: 2024-07-03

## 2024-07-07 ENCOUNTER — CLAIMS CREATED FROM THE CLAIM WINDOW (OUTPATIENT)
Dept: URBAN - METROPOLITAN AREA MEDICAL CENTER 27 | Facility: MEDICAL CENTER | Age: 54
End: 2024-07-07
Payer: COMMERCIAL

## 2024-07-07 DIAGNOSIS — E80.6 ABNORMAL BILIRUBIN TEST: ICD-10-CM

## 2024-07-07 DIAGNOSIS — R10.13 ABDOMINAL DISCOMFORT, EPIGASTRIC: ICD-10-CM

## 2024-07-07 DIAGNOSIS — K31.84 DECREASED MOTILITY OF STOMACH: ICD-10-CM

## 2024-07-07 DIAGNOSIS — R10.12 ABDOMINAL BURNING SENSATION IN LEFT UPPER QUADRANT: ICD-10-CM

## 2024-07-07 PROCEDURE — G8427 DOCREV CUR MEDS BY ELIG CLIN: HCPCS | Performed by: STUDENT IN AN ORGANIZED HEALTH CARE EDUCATION/TRAINING PROGRAM

## 2024-07-07 PROCEDURE — 99255 IP/OBS CONSLTJ NEW/EST HI 80: CPT | Performed by: STUDENT IN AN ORGANIZED HEALTH CARE EDUCATION/TRAINING PROGRAM

## 2024-07-07 PROCEDURE — 99223 1ST HOSP IP/OBS HIGH 75: CPT | Performed by: STUDENT IN AN ORGANIZED HEALTH CARE EDUCATION/TRAINING PROGRAM

## 2024-07-08 ENCOUNTER — CLAIMS CREATED FROM THE CLAIM WINDOW (OUTPATIENT)
Dept: URBAN - METROPOLITAN AREA MEDICAL CENTER 27 | Facility: MEDICAL CENTER | Age: 54
End: 2024-07-08
Payer: COMMERCIAL

## 2024-07-08 ENCOUNTER — OFFICE VISIT (OUTPATIENT)
Dept: URBAN - METROPOLITAN AREA CLINIC 33 | Facility: CLINIC | Age: 54
End: 2024-07-08

## 2024-07-08 ENCOUNTER — TELEPHONE ENCOUNTER (OUTPATIENT)
Dept: URBAN - METROPOLITAN AREA CLINIC 35 | Facility: CLINIC | Age: 54
End: 2024-07-08

## 2024-07-08 DIAGNOSIS — R13.10 ABNORMAL DEGLUTITION: ICD-10-CM

## 2024-07-08 DIAGNOSIS — R10.11 ABDOMINAL BURNING SENSATION IN RIGHT UPPER QUADRANT: ICD-10-CM

## 2024-07-08 DIAGNOSIS — R11.0 AM NAUSEA: ICD-10-CM

## 2024-07-08 DIAGNOSIS — K31.84 DECREASED MOTILITY OF STOMACH: ICD-10-CM

## 2024-07-08 PROCEDURE — 99232 SBSQ HOSP IP/OBS MODERATE 35: CPT | Performed by: STUDENT IN AN ORGANIZED HEALTH CARE EDUCATION/TRAINING PROGRAM

## 2024-07-08 RX ORDER — DESIPRAMINE HYDROCHLORIDE 25 MG/1
1 TABLET TABLET ORAL ONCE A DAY
Qty: 30 | Status: ON HOLD | COMMUNITY
Start: 2022-08-18

## 2024-07-08 RX ORDER — SUCRALFATE 1 G/1
1 TABLET ON AN EMPTY STOMACH TABLET ORAL TWICE A DAY
Qty: 60 | Refills: 1 | Status: ON HOLD | COMMUNITY
Start: 2020-12-07

## 2024-07-08 RX ORDER — NALOXEGOL OXALATE 25 MG/1
1 TABLET IN THE MORNING TABLET, FILM COATED ORAL ONCE A DAY
Qty: 30 | Refills: 0 | Status: ON HOLD | COMMUNITY

## 2024-07-08 RX ORDER — FAMOTIDINE 20 MG/1
1 TABLET AT BEDTIME AS NEEDED TABLET, FILM COATED ORAL TWICE A DAY
COMMUNITY

## 2024-07-08 RX ORDER — LINACLOTIDE 72 UG/1
1 CAPSULE AT LEAST 30 MINUTES BEFORE THE FIRST MEAL OF THE DAY ON AN EMPTY STOMACH CAPSULE, GELATIN COATED ORAL ONCE A DAY
COMMUNITY

## 2024-07-08 RX ORDER — PANTOPRAZOLE SODIUM 40 MG/1
1 TABLET TABLET, DELAYED RELEASE ORAL ONCE A DAY
Qty: 90 TABLET | Refills: 1 | Status: ON HOLD | COMMUNITY
Start: 2020-11-24

## 2024-07-08 RX ORDER — HYOSCYAMINE SULFATE 0.12 MG/1
1 TABLET AS NEEDED TABLET ORAL
Qty: 60 TABLET | Refills: 1 | Status: ON HOLD | COMMUNITY
Start: 2020-11-24

## 2024-07-08 RX ORDER — LORAZEPAM 1 MG/1
1 TABLET AT BEDTIME AS NEEDED TABLET ORAL ONCE A DAY
COMMUNITY

## 2024-07-08 RX ORDER — AMLODIPINE BESYLATE 10 MG/1
1 TABLET TABLET ORAL ONCE A DAY
Qty: 30 | Status: ON HOLD | COMMUNITY

## 2024-07-08 RX ORDER — FUROSEMIDE 40 MG/1
1 TABLET TABLET ORAL ONCE A DAY
COMMUNITY

## 2024-07-08 RX ORDER — ERGOCALCIFEROL 1.25 MG/1
1 CAPSULE CAPSULE ORAL
Qty: 4 | COMMUNITY

## 2024-07-08 RX ORDER — ERGOCALCIFEROL 1.25 MG/1
1 CAPSULE CAPSULE ORAL
COMMUNITY

## 2024-07-08 RX ORDER — FENTANYL 25 UG/H
1 PATCH TO SKIN PATCH TRANSDERMAL
COMMUNITY

## 2024-07-08 RX ORDER — FAMOTIDINE 20 MG/1
1 TABLET TABLET, FILM COATED ORAL TWICE A DAY
Qty: 60 TABLET | Refills: 0 | COMMUNITY
Start: 2021-09-01

## 2024-07-08 RX ORDER — OLMESARTAN MEDOXOMIL 40 MG/1
1 TABLET TABLET ORAL ONCE A DAY
Qty: 30 | COMMUNITY

## 2024-07-08 RX ORDER — LEVOTHYROXINE SODIUM 150 UG/1
1 TABLET IN THE MORNING ON AN EMPTY STOMACH TABLET ORAL ONCE A DAY
COMMUNITY

## 2024-07-08 RX ORDER — ALBUTEROL SULFATE 2.5 MG/3ML
3 ML AS NEEDED SOLUTION RESPIRATORY (INHALATION)
COMMUNITY

## 2024-07-08 NOTE — HPI-HOSPITAL FOLLOWUP
Patient was in [facility] from 06/14/2024-06/21/2024, for a sickle cell crisis and throat pain. While there she has transfusions, and EGD, and labs. The procedure reports and discharge summary have been scaneed into EMR and entered into patient's history. The EGD was done due to patient having abdominal discomfort and a hoarse voise. In short, EGD findings revealed reflux with odynophagia, chronic GERD, and a small hiatal hernia. Patient was discharged with Pantoprazole 40 mg, BID and she admits/denies taking it. Patient admits/denies improvement in the hoarse voice and abdominal discomfort. Of note, patient was discharged with home oxygen.    Lab results from 06/20/2024 were as follows :  Hematology panel, all results were normal except for the following, HGB was ABNORMAL @ 8.2, HCT was ABNORMAL @ 23.7, ANC was ABNORMAL @ 1.24, Absolue lymphocyte count was ABNORMAL @ 3.68, Reticulocyte % was ABNORMAL @ 9.72, Retic absolute was ABNORMAL @ 242.1. Chemistry panel, all results were normal except for the following, Glucose was ABNORMAL @ 138, Calcium was ABNORMAL @ 8.3, Protein was ABNORMAL @ 6.2, Bilirubin was ABNORMAL @ 2.2, and AST was ABNORMAL @ 26.

## 2024-07-08 NOTE — HPI-ENDOSCOPY (EGD) FOLLOWUP
Patient presents today for a follow-up from the EGD that was done on 06/18/2024, while in patient at [facility]. Dr. Gilmore performed the EGD. Since having the procedure, patient admits/denies odynophagia, dysphagia, or globus sensation. Patient admits/denies any complications following the procedure.

## 2024-07-09 ENCOUNTER — CLAIMS CREATED FROM THE CLAIM WINDOW (OUTPATIENT)
Dept: URBAN - METROPOLITAN AREA MEDICAL CENTER 27 | Facility: MEDICAL CENTER | Age: 54
End: 2024-07-09
Payer: COMMERCIAL

## 2024-07-09 DIAGNOSIS — D64.89 ANEMIA DUE TO OTHER CAUSE: ICD-10-CM

## 2024-07-09 DIAGNOSIS — R10.12 ABDOMINAL BURNING SENSATION IN LEFT UPPER QUADRANT: ICD-10-CM

## 2024-07-09 DIAGNOSIS — K31.84 DECREASED MOTILITY OF STOMACH: ICD-10-CM

## 2024-07-09 DIAGNOSIS — R09.A2 FOREIGN BODY SENSATION IN THROAT: ICD-10-CM

## 2024-07-09 PROCEDURE — 99232 SBSQ HOSP IP/OBS MODERATE 35: CPT | Performed by: STUDENT IN AN ORGANIZED HEALTH CARE EDUCATION/TRAINING PROGRAM

## 2024-07-18 ENCOUNTER — TELEPHONE ENCOUNTER (OUTPATIENT)
Dept: URBAN - METROPOLITAN AREA CLINIC 35 | Facility: CLINIC | Age: 54
End: 2024-07-18

## 2024-07-18 ENCOUNTER — OFFICE VISIT (OUTPATIENT)
Dept: URBAN - METROPOLITAN AREA CLINIC 33 | Facility: CLINIC | Age: 54
End: 2024-07-18

## 2024-07-18 VITALS
SYSTOLIC BLOOD PRESSURE: 168 MMHG | BODY MASS INDEX: 22.4 KG/M2 | HEART RATE: 92 BPM | WEIGHT: 160 LBS | DIASTOLIC BLOOD PRESSURE: 92 MMHG | OXYGEN SATURATION: 95 % | HEIGHT: 71 IN

## 2024-07-18 RX ORDER — ERGOCALCIFEROL 1.25 MG/1
1 CAPSULE CAPSULE ORAL
Status: ACTIVE | COMMUNITY

## 2024-07-18 RX ORDER — SUCRALFATE 1 G/1
1 TABLET ON AN EMPTY STOMACH TABLET ORAL TWICE A DAY
Qty: 60 | Refills: 1 | Status: ON HOLD | COMMUNITY
Start: 2020-12-07

## 2024-07-18 RX ORDER — NALOXEGOL OXALATE 25 MG/1
1 TABLET IN THE MORNING TABLET, FILM COATED ORAL ONCE A DAY
Qty: 30 | Refills: 0 | Status: ON HOLD | COMMUNITY

## 2024-07-18 RX ORDER — HYOSCYAMINE SULFATE 0.12 MG/1
1 TABLET AS NEEDED TABLET ORAL
Qty: 60 TABLET | Refills: 1 | Status: ON HOLD | COMMUNITY
Start: 2020-11-24

## 2024-07-18 RX ORDER — ERYTHROMYCIN 250 MG/1
1 TABLET TABLET, DELAYED RELEASE ORAL THREE TIMES A DAY
Status: ACTIVE | COMMUNITY

## 2024-07-18 RX ORDER — DESIPRAMINE HYDROCHLORIDE 25 MG/1
1 TABLET TABLET ORAL ONCE A DAY
Qty: 30 | Status: ON HOLD | COMMUNITY
Start: 2022-08-18

## 2024-07-18 RX ORDER — OLMESARTAN MEDOXOMIL 40 MG/1
1 TABLET TABLET ORAL ONCE A DAY
Qty: 30 | Status: ACTIVE | COMMUNITY

## 2024-07-18 RX ORDER — PANTOPRAZOLE SODIUM 40 MG/1
1 TABLET TABLET, DELAYED RELEASE ORAL ONCE A DAY
Qty: 90 TABLET | Refills: 1 | Status: ON HOLD | COMMUNITY
Start: 2020-11-24

## 2024-07-18 RX ORDER — LORAZEPAM 1 MG/1
1 TABLET AT BEDTIME AS NEEDED TABLET ORAL ONCE A DAY
Status: ACTIVE | COMMUNITY

## 2024-07-18 RX ORDER — FAMOTIDINE 20 MG/1
1 TABLET TABLET, FILM COATED ORAL TWICE A DAY
Qty: 60 TABLET | Refills: 0 | Status: ON HOLD | COMMUNITY
Start: 2021-09-01

## 2024-07-18 RX ORDER — FENTANYL 25 UG/H
1 PATCH TO SKIN PATCH TRANSDERMAL
Status: ACTIVE | COMMUNITY

## 2024-07-18 RX ORDER — FAMOTIDINE 20 MG/1
1 TABLET AT BEDTIME AS NEEDED TABLET, FILM COATED ORAL TWICE A DAY
Status: ON HOLD | COMMUNITY

## 2024-07-18 RX ORDER — LEVOTHYROXINE SODIUM 175 UG/1
1 TABLET IN THE MORNING ON AN EMPTY STOMACH TABLET ORAL ONCE A DAY
Status: ACTIVE | COMMUNITY

## 2024-07-18 RX ORDER — ERGOCALCIFEROL 1.25 MG/1
1 CAPSULE CAPSULE ORAL
Qty: 4 | Status: ACTIVE | COMMUNITY

## 2024-07-18 RX ORDER — LINACLOTIDE 72 UG/1
1 CAPSULE AT LEAST 30 MINUTES BEFORE THE FIRST MEAL OF THE DAY ON AN EMPTY STOMACH CAPSULE, GELATIN COATED ORAL ONCE A DAY
Status: ACTIVE | COMMUNITY

## 2024-07-18 RX ORDER — ALBUTEROL SULFATE 2.5 MG/3ML
3 ML AS NEEDED SOLUTION RESPIRATORY (INHALATION)
Status: ACTIVE | COMMUNITY

## 2024-07-18 RX ORDER — FUROSEMIDE 40 MG/1
1 TABLET TABLET ORAL ONCE A DAY
Status: ACTIVE | COMMUNITY

## 2024-07-18 RX ORDER — AMLODIPINE BESYLATE 10 MG/1
1 TABLET TABLET ORAL ONCE A DAY
Qty: 30 | Status: ON HOLD | COMMUNITY

## 2024-07-18 NOTE — HPI-BLOOD IN STOOL
She states that she has not had any rectal bleeding since her colonoscopy.   Most recent labs completed on 04.23.2024 revealed Iron Binding Capacity: 193L, %Saturation: 56H, Calcium: 8.4L, Bilirubin,Total: 3.0H, Transferrin: 132L, Ferritin: 3292H, all other labs were normal.      Last visit (08/08/2023)  52 year old female patient presents today for a consultation for rectal bleeding. Bleeding started 4 months ago and happens "a few times a week". The blood is BOTH jet black and bright red, and is present on tissue and within the water bowl, enough to stain the water red. Patient states that she believes she feels a hemorrhoid when she has rectal pain. She has been using preparation H, which only helps with the rectal burning and irritation, temporarily. She admits melena. Patient currently admits 1 "incomplete" bowel movement per day, with intermittent strain. She uses Linzess 72 mcg, just as needed. Her stools are "formed but short and incomplete". If she takes the Linzess daily, her stools become too loose. She denies any mucus in her stool. She admits rectal pain or pruritus ani. Admits associated abdominal pain when she is bloated. Patient states that her brother passed away from colon cancer at age 54.

## 2024-07-18 NOTE — HPI-HOSPITAL FOLLOWUP
Patient was in [facility] from 06/14/2024-06/21/2024, for a sickle cell crisis and throat pain. While there she has transfusions, and EGD, and labs. The procedure reports and discharge summary have been scanned into EMR and entered into patient's history. The EGD was done due to patient having abdominal discomfort and a hoarse voice. In short, EGD findings revealed reflux with odynophagia, chronic GERD, and a small hiatal hernia. Patient was discharged with Omeprazole 40 mg, daily and she admits taking it. Patient denies improvement in the hoarse voice and abdominal discomfort. Of note, patient was discharged with home oxygen. Patient admits extreme abdominal pain and throat burning. These are her two biggest complaints at this time.                   Lab results from 06/20/2024 were as follows :  Hematology panel, all results were normal except for the following, HGB was ABNORMAL @ 8.2, HCT was ABNORMAL @ 23.7, ANC was ABNORMAL @ 1.24, Absolue lymphocyte count was ABNORMAL @ 3.68, Reticulocyte % was ABNORMAL @ 9.72, Retic absolute was ABNORMAL @ 242.1. Chemistry panel, all results were normal except for the following, Glucose was ABNORMAL @ 138, Calcium was ABNORMAL @ 8.3, Protein was ABNORMAL @ 6.2, Bilirubin was ABNORMAL @ 2.2, and AST was ABNORMAL @ 26.

## 2024-07-18 NOTE — HPI-ENDOSCOPY (EGD) FOLLOWUP
Patient presents today for a follow-up from the EGD that was done on 06/18/2024, while in patient at [facility]. Dr. Gilmore performed the EGD. Since having the procedure, patient denies dysphagia, or globus sensation. Patient denies any complications following the procedure.

## 2024-07-18 NOTE — HPI-COLONOSCOPY FOLLOWUP
Patient presents today for a follow-up from the colonoscopy. She denies any complications after her procedure. Currently, reports 0-1 bowel movements per day, without strain. Her stools are softly formed without the presence of blood, mucus, and melena. Denies any pruritus ani or rectal pain.

## 2024-08-14 PROBLEM — 235595009: Status: ACTIVE | Noted: 2024-08-14

## 2024-08-15 ENCOUNTER — OFFICE VISIT (OUTPATIENT)
Dept: URBAN - METROPOLITAN AREA CLINIC 33 | Facility: CLINIC | Age: 54
End: 2024-08-15
Payer: COMMERCIAL

## 2024-08-15 VITALS
DIASTOLIC BLOOD PRESSURE: 84 MMHG | BODY MASS INDEX: 24.5 KG/M2 | HEART RATE: 69 BPM | HEIGHT: 71 IN | OXYGEN SATURATION: 94 % | SYSTOLIC BLOOD PRESSURE: 138 MMHG | WEIGHT: 175 LBS

## 2024-08-15 DIAGNOSIS — R10.13 EPIGASTRIC PAIN: ICD-10-CM

## 2024-08-15 DIAGNOSIS — K21.9 CHRONIC GERD: ICD-10-CM

## 2024-08-15 DIAGNOSIS — K31.84 GASTROPARESIS: ICD-10-CM

## 2024-08-15 DIAGNOSIS — D57.00 SICKLE CELL DISEASE WITH CRISIS: ICD-10-CM

## 2024-08-15 DIAGNOSIS — K60.2 ANAL FISSURE: ICD-10-CM

## 2024-08-15 DIAGNOSIS — K59.01 SLOW TRANSIT CONSTIPATION: ICD-10-CM

## 2024-08-15 PROCEDURE — 99214 OFFICE O/P EST MOD 30 MIN: CPT | Performed by: INTERNAL MEDICINE

## 2024-08-15 RX ORDER — OMEPRAZOLE 40 MG/1
1 CAPSULE 30 MINUTES BEFORE MORNING MEAL AND DINNER CAPSULE, DELAYED RELEASE ORAL TWICE A DAY
Qty: 180 | Refills: 3 | OUTPATIENT

## 2024-08-15 RX ORDER — ALBUTEROL SULFATE 2.5 MG/3ML
3 ML AS NEEDED SOLUTION RESPIRATORY (INHALATION)
Status: ACTIVE | COMMUNITY

## 2024-08-15 RX ORDER — LINACLOTIDE 145 UG/1
1 CAPSULE AT LEAST 30 MINUTES BEFORE THE FIRST MEAL OF THE DAY ON AN EMPTY STOMACH CAPSULE, GELATIN COATED ORAL ONCE A DAY
Status: ACTIVE | COMMUNITY

## 2024-08-15 RX ORDER — OMEPRAZOLE 40 MG/1
1 CAPSULE 30 MINUTES BEFORE MORNING MEAL CAPSULE, DELAYED RELEASE ORAL ONCE A DAY
Status: ACTIVE | COMMUNITY

## 2024-08-15 RX ORDER — LORAZEPAM 1 MG/1
1 TABLET AT BEDTIME AS NEEDED TABLET ORAL ONCE A DAY
Status: ACTIVE | COMMUNITY

## 2024-08-15 RX ORDER — SUCRALFATE 1 G/10ML
10 ML 1 HOUR BEFORE MEALS AND AT BEDTIME ON AN EMPTY STOMACH SUSPENSION ORAL
Status: ACTIVE | COMMUNITY

## 2024-08-15 RX ORDER — LEVOTHYROXINE SODIUM 175 UG/1
1 TABLET IN THE MORNING ON AN EMPTY STOMACH TABLET ORAL ONCE A DAY
Status: ACTIVE | COMMUNITY

## 2024-08-15 RX ORDER — AMLODIPINE BESYLATE 10 MG/1
1 TABLET TABLET ORAL ONCE A DAY
Qty: 30 | Status: ON HOLD | COMMUNITY

## 2024-08-15 RX ORDER — FUROSEMIDE 40 MG/1
1 TABLET TABLET ORAL ONCE A DAY
Status: ACTIVE | COMMUNITY

## 2024-08-15 RX ORDER — DESIPRAMINE HYDROCHLORIDE 25 MG/1
1 TABLET TABLET ORAL ONCE A DAY
Qty: 30 | Status: ON HOLD | COMMUNITY
Start: 2022-08-18

## 2024-08-15 RX ORDER — SUCRALFATE 1 G/1
1 TABLET ON AN EMPTY STOMACH TABLET ORAL TWICE A DAY
Qty: 60 | Refills: 1 | Status: ON HOLD | COMMUNITY
Start: 2020-12-07

## 2024-08-15 RX ORDER — ERGOCALCIFEROL 1.25 MG/1
1 CAPSULE CAPSULE ORAL
Qty: 4 | Status: ON HOLD | COMMUNITY

## 2024-08-15 RX ORDER — ERYTHROMYCIN 250 MG/1
1 TABLET TABLET, DELAYED RELEASE ORAL THREE TIMES A DAY
Status: ACTIVE | COMMUNITY

## 2024-08-15 RX ORDER — HYOSCYAMINE SULFATE 0.12 MG/1
1 TABLET AS NEEDED TABLET ORAL
Qty: 60 TABLET | Refills: 1 | Status: ON HOLD | COMMUNITY
Start: 2020-11-24

## 2024-08-15 RX ORDER — ERGOCALCIFEROL 1.25 MG/1
1 CAPSULE CAPSULE ORAL
Status: ACTIVE | COMMUNITY

## 2024-08-15 RX ORDER — PANTOPRAZOLE SODIUM 40 MG/1
1 TABLET TABLET, DELAYED RELEASE ORAL ONCE A DAY
Qty: 90 TABLET | Refills: 1 | Status: ON HOLD | COMMUNITY
Start: 2020-11-24

## 2024-08-15 RX ORDER — FAMOTIDINE 20 MG/1
1 TABLET AT BEDTIME AS NEEDED TABLET, FILM COATED ORAL TWICE A DAY
Status: ON HOLD | COMMUNITY

## 2024-08-15 RX ORDER — OLMESARTAN MEDOXOMIL 40 MG/1
1 TABLET TABLET ORAL ONCE A DAY
Qty: 30 | Status: ACTIVE | COMMUNITY

## 2024-08-15 RX ORDER — FAMOTIDINE 20 MG/1
1 TABLET TABLET, FILM COATED ORAL TWICE A DAY
Qty: 60 TABLET | Refills: 0 | Status: ON HOLD | COMMUNITY
Start: 2021-09-01

## 2024-08-15 RX ORDER — FENTANYL 25 UG/H
1 PATCH TO SKIN PATCH TRANSDERMAL
Status: ACTIVE | COMMUNITY

## 2024-08-15 RX ORDER — NALOXEGOL OXALATE 25 MG/1
1 TABLET IN THE MORNING TABLET, FILM COATED ORAL ONCE A DAY
Qty: 30 | Refills: 0 | Status: ON HOLD | COMMUNITY

## 2024-08-15 RX ORDER — SUCRALFATE 1 G/1
1 TABLET ON AN EMPTY STOMACH TWICE DAILY TABLET ORAL TWICE A DAY
Qty: 180 TABLET | Refills: 3 | OUTPATIENT

## 2024-08-15 NOTE — HPI-HOSPITAL FOLLOW-UP
Patient was hospitalized from 07/18/2024-07/30/2024. She was diagnosed with COVID after being evaluated. She completed a course of Remdesivir and Doxycycline. Azithromycin was started on 07/25/2024 and continued through the time of her discharge. Blood transfusions were also provided, so she could maintain a hemoglobin of 8.0 or higher. After patient indicated she was comfortable going home, she was discharged. The discharge summary has been scanned into EMR.

## 2024-08-15 NOTE — HPI-BLOOD IN STOOL
Patient denies any rectal bleeding, since her last office visit. She is taking Linzess 145 mcg, just as needed. She is currently having 1 incomplete bowel movement per day with "little" stool production. Patient denies straining. She denies blood, mucus, or melena in her stools. Patient denies rectal pain or pruritus ani.   Last visit (07/18/2024) She states that she has not had any rectal bleeding since her colonoscopy.   Most recent labs completed on 04.23.2024 revealed Iron Binding Capacity: 193L, saturation: 56H, Calcium: 8.4L, Bilirubin,Total: 3.0H, Transferrin: 132L, Ferritin: 3292H, all other labs were normal.      Last visit (08/08/2023)  52 year old female patient presents today for a consultation for rectal bleeding. Bleeding started 4 months ago and happens "a few times a week". The blood is BOTH jet black and bright red, and is present on tissue and within the water bowl, enough to stain the water red. Patient states that she believes she feels a hemorrhoid when she has rectal pain. She has been using preparation H, which only helps with the rectal burning and irritation, temporarily. She admits melena. Patient currently admits 1 "incomplete" bowel movement per day, with intermittent strain. She uses Linzess 72 mcg, just as needed. Her stools are "formed but short and incomplete". If she takes the Linzess daily, her stools become too loose. She denies any mucus in her stool. She admits rectal pain or pruritus ani. Admits associated abdominal pain when she is bloated. Patient states that her brother passed away from colon cancer at age 54.

## 2024-08-15 NOTE — HPI-EPIGASTRIC PAIN
Patient presents today for a follow-up for epigastric pain. She is currently taking Omeprazole 40 mg, daily and Sucralfate 1 GM/10mL twice a day. Patient admits slight improvement in the epigastric pain. She admits improvement in the associated throat burning. She reports that the throat burning is gone for the most part. Patient admits intermittent associated heartburn. SHe would like to discuss the use os Sucralfate. Patient also requests a refill of Omeprazole 40 mg, daily, in a 90-day supply, sent to the confirmed pharmacy in EMR.

## 2024-10-17 ENCOUNTER — OFFICE VISIT (OUTPATIENT)
Dept: URBAN - METROPOLITAN AREA CLINIC 33 | Facility: CLINIC | Age: 54
End: 2024-10-17

## 2025-07-17 ENCOUNTER — OFFICE VISIT (OUTPATIENT)
Dept: URBAN - METROPOLITAN AREA CLINIC 33 | Facility: CLINIC | Age: 55
End: 2025-07-17

## 2025-07-17 ENCOUNTER — TELEPHONE ENCOUNTER (OUTPATIENT)
Dept: URBAN - METROPOLITAN AREA CLINIC 35 | Facility: CLINIC | Age: 55
End: 2025-07-17

## 2025-07-17 PROBLEM — 235494005: Status: ACTIVE | Noted: 2025-07-17

## 2025-07-17 RX ORDER — FENTANYL 25 UG/H
1 PATCH TO SKIN PATCH TRANSDERMAL
Status: ACTIVE | COMMUNITY

## 2025-07-17 RX ORDER — FAMOTIDINE 20 MG/1
1 TABLET AT BEDTIME AS NEEDED TABLET, FILM COATED ORAL TWICE A DAY
Status: ON HOLD | COMMUNITY

## 2025-07-17 RX ORDER — NALOXEGOL OXALATE 25 MG/1
1 TABLET IN THE MORNING TABLET, FILM COATED ORAL ONCE A DAY
Qty: 30 | Refills: 0 | COMMUNITY

## 2025-07-17 RX ORDER — FUROSEMIDE 40 MG/1
1 TABLET TABLET ORAL ONCE A DAY
Status: ACTIVE | COMMUNITY

## 2025-07-17 RX ORDER — OLMESARTAN MEDOXOMIL 40 MG/1
1 TABLET TABLET ORAL ONCE A DAY
Qty: 30 | Status: ACTIVE | COMMUNITY

## 2025-07-17 RX ORDER — OMEPRAZOLE 40 MG/1
1 CAPSULE 30 MINUTES BEFORE MORNING MEAL AND DINNER CAPSULE, DELAYED RELEASE ORAL TWICE A DAY
Qty: 180 | Refills: 3 | Status: ACTIVE | COMMUNITY

## 2025-07-17 RX ORDER — SUCRALFATE 1 G/1
1 TABLET ON AN EMPTY STOMACH TWICE DAILY TABLET ORAL TWICE A DAY
Qty: 180 TABLET | Refills: 3 | COMMUNITY

## 2025-07-17 RX ORDER — LINACLOTIDE 145 UG/1
1 CAPSULE AT LEAST 30 MINUTES BEFORE THE FIRST MEAL OF THE DAY ON AN EMPTY STOMACH CAPSULE, GELATIN COATED ORAL ONCE A DAY
Status: ACTIVE | COMMUNITY

## 2025-07-17 RX ORDER — ERGOCALCIFEROL 1.25 MG/1
1 CAPSULE CAPSULE ORAL
Status: ACTIVE | COMMUNITY

## 2025-07-17 RX ORDER — ERGOCALCIFEROL 1.25 MG/1
1 CAPSULE CAPSULE ORAL
Qty: 4 | COMMUNITY

## 2025-07-17 RX ORDER — ERYTHROMYCIN 250 MG/1
1 TABLET TABLET, DELAYED RELEASE ORAL THREE TIMES A DAY
Status: ACTIVE | COMMUNITY

## 2025-07-17 RX ORDER — AMLODIPINE BESYLATE 10 MG/1
1 TABLET TABLET ORAL ONCE A DAY
Qty: 30 | Status: ON HOLD | COMMUNITY

## 2025-07-17 RX ORDER — DESIPRAMINE HYDROCHLORIDE 25 MG/1
1 TABLET TABLET ORAL ONCE A DAY
Qty: 30 | Status: ON HOLD | COMMUNITY
Start: 2022-08-18

## 2025-07-17 RX ORDER — LORAZEPAM 1 MG/1
1 TABLET AT BEDTIME AS NEEDED TABLET ORAL ONCE A DAY
Status: ACTIVE | COMMUNITY

## 2025-07-17 RX ORDER — SUCRALFATE 1 G/1
1 TABLET ON AN EMPTY STOMACH TABLET ORAL TWICE A DAY
Qty: 60 | Refills: 1 | COMMUNITY
Start: 2020-12-07

## 2025-07-17 RX ORDER — ALBUTEROL SULFATE 2.5 MG/3ML
3 ML AS NEEDED SOLUTION RESPIRATORY (INHALATION)
Status: ACTIVE | COMMUNITY

## 2025-07-17 RX ORDER — LEVOTHYROXINE SODIUM 175 UG/1
1 TABLET IN THE MORNING ON AN EMPTY STOMACH TABLET ORAL ONCE A DAY
Status: ACTIVE | COMMUNITY

## 2025-07-17 RX ORDER — PANTOPRAZOLE SODIUM 40 MG/1
1 TABLET TABLET, DELAYED RELEASE ORAL ONCE A DAY
Qty: 90 TABLET | Refills: 1 | Status: ACTIVE | COMMUNITY
Start: 2020-11-24

## 2025-07-17 RX ORDER — FAMOTIDINE 20 MG/1
1 TABLET TABLET, FILM COATED ORAL TWICE A DAY
Qty: 60 TABLET | Refills: 0 | Status: ON HOLD | COMMUNITY
Start: 2021-09-01

## 2025-07-17 RX ORDER — HYOSCYAMINE SULFATE 0.12 MG/1
1 TABLET AS NEEDED TABLET ORAL
Qty: 60 TABLET | Refills: 1 | Status: ON HOLD | COMMUNITY
Start: 2020-11-24

## 2025-07-17 NOTE — HPI-EPIGASTRIC PAIN
Patient presents today for a follow-up for epigastric pain. She is currently taking Omeprazole 40 mg, and Erythromycin capsules daily without relief of symptoms. Patient denies any improvement in the epigastric pain. She denies associated throat burning. Patient denies intermittent associated heartburn.  Most recent labs completed on 06.21.2025 [facility] All values within normal limits except RBC 2.79H, Hgb 8.9L, Hct 25.8L, MCH 31.9L, RDW 18.6H, NRBC Auto 1H, Chemistry all values within normal limits   Lab completed on 06.20.2025 [facility] all values within normal limits except RBC 2.73L, Hgb 8.7L, Hct 25.1L, MCH 32.0H, MCHC 31.9H, RDW 19H, NRBC 1H, Chemistry all values within normal limits except Bilirubin, Total 2.2H, Alk Phos 194H, AST 69H  CT CHEST, ABDOMEN AND PELVIS WITHOUT CONTRAST 06.11.2025 IMPRESSION  1. No acute findings in the chest, abdomen, or pelvis. 2. Chronic splenic infarction. 3. Cholecystectomy  CT ABDOMEN AND PELVIS WITHOUT CONTRAST 03.03.2025 IMPRESSION 1. No definitive acute focal inflammatory pathology seen in the abdomen  and pelvis 2. Large volume of stool throughout the colon with distension of the rectum concerning for constipation and fecal impaction. There are no obstructive features of free intraperitoneal gas fluid 4. Status post-cholecystecomy  CT ABDOMEN, CHEST, AND PELVIS WITHOUT CONRAST 10/1/2024 IMPRESSION 1. No acute traumatic injury to the chest, abdomen, or pelvis 2. Left internal jugular port is in place 3. Cardiomegaly with mild interstitial edema. 4. Autoinfarction of spleen 5. 3.8 xd 3.2 cm left ovarian cyst. Recommend follow-up ultrasound in 8-10 weeks.   CT ABDOMEN AND PELVIS WITHOUT CONTRAST 09.16.2024 IMPRESSION 1. No acute abdominopelvic process by noncontrast technique   Last Visit 08.15.2024 Patient presents today for a follow-up for epigastric pain. She is currently taking Omeprazole 40 mg, daily and Sucralfate 1 GM/10mL twice a day. Patient admits slight improvement in the epigastric pain. She admits improvement in the associated throat burning. She reports that the throat burning is gone for the most part. Patient admits intermittent associated heartburn. SHe would like to discuss the use os Sucralfate. Patient also requests a refill of Omeprazole 40 mg, daily, in a 90-day supply, sent to the confirmed pharmacy in EMR.

## 2025-07-17 NOTE — HPI-HOSPITAL FOLLOWUP
54 year old female patient with underlying sickle cell disease, present today for a recent [facility] visit. Patient was admitted on 06.11.2025 and discharged on 06.21.2025 for evaluation for persistent cough, abdominal discomfort, as well as Rinovirus. She admits changes in bowel habits recently. Admits nausea and vomiting. Admits back and forth constipation and diarrhea. Admits most recent episode of constipation was two days ago. Admits 1 BM a day, if she takes the linzess it can be up to 8 BMs a day. Describes her stools as normal-loose but very small in size. Admits incomplete evavuation. She had labs and imaging completed, with results in the EMR. Since ED visit, she admits she is still having abdominal pain. Admits sharp pain that is located in her general abdominal region that radiates to her back. Admits a lot of abdominal distension. Admits difficulty eating because eating makes the pain worse. Patient said pain is only alleviated when she does not eat and from leaning over. The discharge summary has been scanned into EMR under hospital in pt docs.

## 2025-08-05 ENCOUNTER — OFFICE VISIT (OUTPATIENT)
Dept: URBAN - METROPOLITAN AREA CLINIC 115 | Facility: CLINIC | Age: 55
End: 2025-08-05

## 2025-08-26 ENCOUNTER — OFFICE VISIT (OUTPATIENT)
Dept: URBAN - METROPOLITAN AREA CLINIC 115 | Facility: CLINIC | Age: 55
End: 2025-08-26
Payer: COMMERCIAL

## 2025-08-26 DIAGNOSIS — R10.13 EPIGASTRIC PAIN: ICD-10-CM

## 2025-08-26 DIAGNOSIS — K31.84 GASTROPARESIS: ICD-10-CM

## 2025-08-26 PROCEDURE — 99203 OFFICE O/P NEW LOW 30 MIN: CPT | Performed by: STUDENT IN AN ORGANIZED HEALTH CARE EDUCATION/TRAINING PROGRAM

## 2025-08-26 RX ORDER — FENTANYL 25 UG/H
1 PATCH TO SKIN PATCH TRANSDERMAL
Status: ACTIVE | COMMUNITY

## 2025-08-26 RX ORDER — DESIPRAMINE HYDROCHLORIDE 25 MG/1
1 TABLET TABLET ORAL ONCE A DAY
Qty: 30 | Status: ON HOLD | COMMUNITY
Start: 2022-08-18

## 2025-08-26 RX ORDER — LORAZEPAM 1 MG/1
1 TABLET AT BEDTIME AS NEEDED TABLET ORAL ONCE A DAY
Status: ACTIVE | COMMUNITY

## 2025-08-26 RX ORDER — SUCRALFATE 1 G/1
1 TABLET ON AN EMPTY STOMACH TABLET ORAL TWICE A DAY
Qty: 60 | Refills: 1 | Status: ON HOLD | COMMUNITY
Start: 2020-12-07

## 2025-08-26 RX ORDER — ERGOCALCIFEROL 1.25 MG/1
1 CAPSULE CAPSULE ORAL
Qty: 4 | Status: ACTIVE | COMMUNITY

## 2025-08-26 RX ORDER — OLMESARTAN MEDOXOMIL 40 MG/1
1 TABLET TABLET ORAL ONCE A DAY
Qty: 30 | Status: ACTIVE | COMMUNITY

## 2025-08-26 RX ORDER — LINACLOTIDE 145 UG/1
1 CAPSULE AT LEAST 30 MINUTES BEFORE THE FIRST MEAL OF THE DAY ON AN EMPTY STOMACH CAPSULE, GELATIN COATED ORAL ONCE A DAY
Status: ACTIVE | COMMUNITY

## 2025-08-26 RX ORDER — SUCRALFATE 1 G/1
1 TABLET ON AN EMPTY STOMACH TWICE DAILY TABLET ORAL TWICE A DAY
Qty: 180 TABLET | Refills: 3 | Status: ON HOLD | COMMUNITY

## 2025-08-26 RX ORDER — HYOSCYAMINE SULFATE 0.12 MG/1
1 TABLET AS NEEDED TABLET ORAL
Qty: 60 TABLET | Refills: 1 | Status: ON HOLD | COMMUNITY
Start: 2020-11-24

## 2025-08-26 RX ORDER — OMEPRAZOLE 40 MG/1
1 CAPSULE 30 MINUTES BEFORE MORNING MEAL AND DINNER CAPSULE, DELAYED RELEASE ORAL TWICE A DAY
Qty: 180 | Refills: 3 | Status: ACTIVE | COMMUNITY

## 2025-08-26 RX ORDER — ERGOCALCIFEROL 1.25 MG/1
1 CAPSULE CAPSULE ORAL
Status: ACTIVE | COMMUNITY

## 2025-08-26 RX ORDER — AMLODIPINE BESYLATE 10 MG/1
1 TABLET TABLET ORAL ONCE A DAY
Qty: 30 | Status: ON HOLD | COMMUNITY

## 2025-08-26 RX ORDER — LEVOTHYROXINE SODIUM 175 UG/1
1 TABLET IN THE MORNING ON AN EMPTY STOMACH TABLET ORAL ONCE A DAY
Status: ACTIVE | COMMUNITY

## 2025-08-26 RX ORDER — ALBUTEROL SULFATE 2.5 MG/3ML
3 ML AS NEEDED SOLUTION RESPIRATORY (INHALATION)
Status: ACTIVE | COMMUNITY

## 2025-08-26 RX ORDER — PANTOPRAZOLE SODIUM 40 MG/1
1 TABLET TABLET, DELAYED RELEASE ORAL ONCE A DAY
Qty: 90 TABLET | Refills: 1 | Status: ACTIVE | COMMUNITY
Start: 2020-11-24

## 2025-08-26 RX ORDER — ERYTHROMYCIN 250 MG/1
1 TABLET TABLET, DELAYED RELEASE ORAL THREE TIMES A DAY
Status: ON HOLD | COMMUNITY

## 2025-08-26 RX ORDER — FAMOTIDINE 20 MG/1
1 TABLET AT BEDTIME AS NEEDED TABLET, FILM COATED ORAL TWICE A DAY
Status: ON HOLD | COMMUNITY

## 2025-08-26 RX ORDER — NALOXEGOL OXALATE 25 MG/1
1 TABLET IN THE MORNING TABLET, FILM COATED ORAL ONCE A DAY
Qty: 30 | Refills: 0 | Status: ON HOLD | COMMUNITY

## 2025-08-26 RX ORDER — FUROSEMIDE 40 MG/1
1 TABLET TABLET ORAL ONCE A DAY
Status: ACTIVE | COMMUNITY

## 2025-08-26 RX ORDER — FAMOTIDINE 20 MG/1
1 TABLET TABLET, FILM COATED ORAL TWICE A DAY
Qty: 60 TABLET | Refills: 0 | Status: ON HOLD | COMMUNITY
Start: 2021-09-01